# Patient Record
Sex: FEMALE | Race: WHITE | NOT HISPANIC OR LATINO | Employment: OTHER | URBAN - METROPOLITAN AREA
[De-identification: names, ages, dates, MRNs, and addresses within clinical notes are randomized per-mention and may not be internally consistent; named-entity substitution may affect disease eponyms.]

---

## 2017-03-22 ENCOUNTER — OFFICE VISIT (OUTPATIENT)
Dept: OPHTHALMOLOGY | Facility: CLINIC | Age: 62
End: 2017-03-22
Payer: COMMERCIAL

## 2017-03-22 DIAGNOSIS — Z96.1 PSEUDOPHAKIA OF BOTH EYES: ICD-10-CM

## 2017-03-22 DIAGNOSIS — Z98.890 HISTORY OF REFRACTIVE SURGERY: ICD-10-CM

## 2017-03-22 DIAGNOSIS — H43.813 PVD (POSTERIOR VITREOUS DETACHMENT), BILATERAL: Primary | ICD-10-CM

## 2017-03-22 DIAGNOSIS — Z83.511 FAMILY HISTORY OF GLAUCOMA: ICD-10-CM

## 2017-03-22 PROCEDURE — 92014 COMPRE OPH EXAM EST PT 1/>: CPT | Mod: S$GLB,,, | Performed by: OPHTHALMOLOGY

## 2017-03-22 PROCEDURE — 99999 PR PBB SHADOW E&M-EST. PATIENT-LVL II: CPT | Mod: PBBFAC,,, | Performed by: OPHTHALMOLOGY

## 2017-03-22 NOTE — MR AVS SNAPSHOT
[facility] - Ophthalmology  [address] [facility] [place] [address]  Phone: [phone]  Fax: [phone]                  Tammy Mackenzie   3/22/2017 2:30 PM   Office Visit    Description:  Female : 1955   Provider:  Javy Jones MD   Department:  [facility] - Ophthalmology           Reason for Visit     Blurred Vision     Spots and/or Floaters           Diagnoses this Visit        Comments    PVD (posterior vitreous detachment), bilateral    -  Primary     Pseudophakia of both eyes         History of refractive surgery         Family history of glaucoma                To Do List           Goals (5 Years of Data)     None      Follow-Up and Disposition     Return in about 1 year (around 3/22/2018).      [facility]     [facility] On Call Nurse [facility] -  Assistance  Registered nurses in the [facility] On Call Center provide clinical advisement, health education, appointment booking, and other advisory services.  Call for this free service at [phone].             Medications           Message regarding Medications     Verify the changes and/or additions to your medication regime listed below are the same as discussed with your clinician today.  If any of these changes or additions are incorrect, please notify your healthcare provider.             Verify that the below list of medications is an accurate representation of the medications you are currently taking.  If none reported, the list may be blank. If incorrect, please contact your healthcare provider. Carry this list with you in case of emergency.           Current Medications     METFORMIN HCL (METFORMIN ORAL) Take by mouth.    UNKNOWN TO PATIENT Thyroid medication    UNKNOWN TO PATIENT occasionally takes a blood pressure medication    erythromycin (ROMYCIN) ophthalmic ointment Place into both eyes every 6 (six) hours.    NAPHAZOLINE HCL (CLEAR EYES OPHT) Apply to eye.           Clinical Reference Information           Allergies as of  3/22/2017     Codeine      Immunizations Administered on Date of Encounter - 3/22/2017     None      [facility] Sign-Up     Activating your [facility] account is as easy as 1-2-3!     1) Visit [email or web address], select Sign Up Now, enter this activation code and your date of birth, then select Next.  [email or web address]  Expires: 5/6/2017  3:45 PM      2) Create a username and password to use when you visit [facility] in the future and select a security question in case you lose your password and select Next.    3) Enter your e-mail address and click Sign Up!    Additional Information  If you have questions, please e-mail [email or web address] or call [phone] to talk to our [facility] staff. Remember, [facility] is NOT to be used for urgent needs. For medical emergencies, dial 911.         Language Assistance Services     ATTENTION: Language assistance services are available, free of charge. Please call [phone].      ATENCIÓN: Si habla avis, tiene a munoz disposición servicios gratuitos de asistencia lingüística. Llame al [phone].     BETTE Ý: N?u b?n nói Ti?ng Vi?t, có các d?ch v? h? tr? ngôn ng? mi?n phí dành cho b?n. G?i s? [phone].         [facility] - [facility] complies with applicable Federal civil rights laws and does not discriminate on the basis of race, color, national origin, age, disability, or sex.

## 2017-03-22 NOTE — PROGRESS NOTES
SUBJECTIVE:   Tammy Mackenzie is a 61 y.o. female   Corrected distance visual acuity was 20/40 in the right eye and 20/80 in the left eye.   Chief Complaint   Patient presents with    Blurred Vision     pt states va has been very blurred distance and near with corrections    Spots and/or Floaters     pt stated a week ago there was a flash of light OS now seeing black floaters        HPI:  HPI     Blurred Vision    Additional comments: pt states va has been very blurred distance and near   with corrections           Spots and/or Floaters    Additional comments: pt stated a week ago there was a flash of light OS   now seeing black floaters       Last edited by Fatmata Knapp MA on 3/22/2017  2:54 PM. (History)        Assessment /Plan :  1. PVD (posterior vitreous detachment), bilateral Patient seen and evaluated for PVD OU. No tears or breaks were seen after careful retinal evaluation. Discussed retinal detachment signs and symptoms including flashes of lights, floaters, perceived curtains or veils. Advised to patient to monitor visual status including increase in flashes and floaters, or the development of visual field changes including curtain and /or veils. Advised patient to RTC urgently if these symptoms occur. Explained the need for follow up exams to the patient even if there are no changes in the symptoms.   2. Pseudophakia of both eyes doing well   3. History of refractive surgery refer to OD for contact lens fit   4.      Family history of glaucoma no disease now      RTC in 1 year or prn any changes

## 2017-03-28 ENCOUNTER — OFFICE VISIT (OUTPATIENT)
Dept: OPHTHALMOLOGY | Facility: CLINIC | Age: 62
End: 2017-03-28
Payer: COMMERCIAL

## 2017-03-28 DIAGNOSIS — Z96.1 PSEUDOPHAKIA OF BOTH EYES: ICD-10-CM

## 2017-03-28 DIAGNOSIS — H17.9 BILATERAL CORNEAL SCARS: ICD-10-CM

## 2017-03-28 DIAGNOSIS — H52.213 IRREGULAR ASTIGMATISM, BILATERAL: Primary | ICD-10-CM

## 2017-03-28 PROCEDURE — 99499 UNLISTED E&M SERVICE: CPT | Mod: S$GLB,,, | Performed by: OPTOMETRIST

## 2017-03-28 PROCEDURE — 99999 PR PBB SHADOW E&M-EST. PATIENT-LVL I: CPT | Mod: PBBFAC,,, | Performed by: OPTOMETRIST

## 2017-03-28 PROCEDURE — 92310 CONTACT LENS FITTING OU: CPT | Mod: ,,, | Performed by: OPTOMETRIST

## 2017-03-28 NOTE — LETTER
March 29, 2017      Javy Jones MD  [address] [address] [place] [address]           [facility] - Ophthalmology  [address] [place] [address]  Phone: [phone]  Fax: [phone]          Patient: Tammy Mackenzie   MR Number: [id number]   YOB: 1955   Date of Visit: 3/28/2017       Dear Dr. aJvy Jones:    Thank you for referring Tammy Mackenzie to me for evaluation. Attached you will find relevant portions of my assessment and plan of care.    If you have questions, please do not hesitate to call me. I look forward to following Tammy Mackenzie along with you.    Sincerely,    HEMANT Marte, OD    Enclosure  CC:  No Recipients    If you would like to receive this communication electronically, please contact [email or web address] or [phone] to request more information on [employer] Link access.    For providers and/or their staff who would like to refer a patient to [facility], please contact us through our one-stop-shop provider referral line, [facility] , at [phone].    If you feel you have received this communication in error or would no longer like to receive these types of communications, please e-mail [email or web address]

## 2017-03-28 NOTE — MR AVS SNAPSHOT
[facility] - Ophthalmology  [address] [facility] [address] [place] [address]  Phone: [phone]  Fax: [phone]                  Tammy Mackenzie   3/28/2017 2:45 PM   Office Visit    Description:  Female : 1955   Provider:  HEMANT Marte OD   Department:  [facility] - Ophthalmology           Reason for Visit     Contact Lens Fit           Diagnoses this Visit        Comments    Irregular astigmatism, bilateral    -  Primary     Bilateral corneal scars         Pseudophakia of both eyes                To Do List           Goals (5 Years of Data)     None      [facility]     [facility] Nurse Care [facility] -  Assistance  Registered nurses in the [facility] On Call Center provide clinical advisement, health education, appointment booking, and other advisory services.  Call for this free service at [phone].             Medications           Message regarding Medications     Verify the changes and/or additions to your medication regime listed below are the same as discussed with your clinician today.  If any of these changes or additions are incorrect, please notify your healthcare provider.             Verify that the below list of medications is an accurate representation of the medications you are currently taking.  If none reported, the list may be blank. If incorrect, please contact your healthcare provider. Carry this list with you in case of emergency.           Current Medications     erythromycin (ROMYCIN) ophthalmic ointment Place into both eyes every 6 (six) hours.    METFORMIN HCL (METFORMIN ORAL) Take by mouth.    NAPHAZOLINE HCL (CLEAR EYES OPHT) Apply to eye.    UNKNOWN TO PATIENT Thyroid medication    UNKNOWN TO PATIENT occasionally takes a blood pressure medication           Clinical Reference Information           Allergies as of 3/28/2017     Codeine      Immunizations Administered on Date of Encounter - 3/28/2017     None      [facility] Sign-Up     Activating your [facility] account is as  easy as 1-2-3!     1) Visit [email or web address], select Sign Up Now, enter this activation code and your date of birth, then select Next.  [email or web address]  Expires: 5/6/2017  3:45 PM      2) Create a username and password to use when you visit [facility] in the future and select a security question in case you lose your password and select Next.    3) Enter your e-mail address and click Sign Up!    Additional Information  If you have questions, please e-mail [email or web address] or call [phone] to talk to our [facility] staff. Remember, [facility] is NOT to be used for urgent needs. For medical emergencies, dial 911.         Language Assistance Services     ATTENTION: Language assistance services are available, free of charge. Please call [phone].      ATENCIÓN: Si habla avis, tiene a munoz disposición servicios gratuitos de asistencia lingüística. Llame al [phone].     [facility] Ý: N?u b?n nói Ti?ng Vi?t, có các d?ch v? h? tr? ngôn ng? mi?n phí dành cho b?n. G?i s? [phone].         [facility] complies with applicable Federal civil rights laws and does not discriminate on the basis of race, color, national origin, age, disability, or sex.

## 2017-03-29 NOTE — PROGRESS NOTES
HPI     Last CPG exam 03/22/2017  Diabetic  Pseudophakia, OU  PVD, bilateral  RK, OU  RE    MLC:  Severe irregular astigmatism OU secondary to unsuccessful RK/AK   surgeries in the past.  Referred to me by Robert to see if rigid CL are   an option to improve vision.  Coincidentally, the reps from the company   [employer] are here today.  They specialize in fitting hybrid CL on   irregular corneas.  They will assist and teach me today.       Last edited by HEMANT Marte, OD on 3/29/2017  3:28 PM.         Assessment /Plan     For exam results, see Encounter Report.    Irregular astigmatism, bilateral    Bilateral corneal scars    Pseudophakia of both eyes      Contact lens fit done today for irregular astigmatism.  [employer] reps helped me with the fit.  The corneal topography showed gross changes from the upper thirties to low fifties.  Multiple lenses from the reps fitting set were tried.  We ultimately settled on a fit which gives the patient 20/25 OD and 20/40 OS!!!  The patient is very happy.  We will order the lenses and dispense these when they arrive.  I told the patient that the total charges would be around $650.00 fit and CL included and she agreed.      Final lenses ordered were: [employer] FC      OD:  Vault = 455  Skirt = steep  Power = -7.25                                                  [employer] FC      OS:  Vault = 355  Skirt = steep  Power = -4.00      SEE AT DISPENSE.    CLEAR CARE OR BIOTRUE.

## 2017-04-12 ENCOUNTER — TELEPHONE (OUTPATIENT)
Dept: OPHTHALMOLOGY | Facility: CLINIC | Age: 62
End: 2017-04-12

## 2017-04-12 NOTE — TELEPHONE ENCOUNTER
Spoke to patient  And scheduled appointment for tomorrow for fiting. Alley states Dr. Marte has CL in his office

## 2017-04-12 NOTE — TELEPHONE ENCOUNTER
----- Message from Maribel James sent at 4/12/2017  3:49 PM CDT -----  Contact: Pt   Pt called and stated she needed to speak to the nurse. She stated she called three (3) times before this time requesting her contact lenses from Dr. Marte. She stated she will going out of town ans will need them to drive. She can be reached at [phone].    Thanks,  TF

## 2017-04-13 ENCOUNTER — OFFICE VISIT (OUTPATIENT)
Dept: OPHTHALMOLOGY | Facility: CLINIC | Age: 62
End: 2017-04-13
Payer: COMMERCIAL

## 2017-04-13 DIAGNOSIS — Z46.0 CONTACT LENS/GLASSES FITTING: Primary | ICD-10-CM

## 2017-04-13 PROCEDURE — 99499 UNLISTED E&M SERVICE: CPT | Mod: S$GLB,,, | Performed by: OPTOMETRIST

## 2017-04-13 PROCEDURE — 99999 PR PBB SHADOW E&M-EST. PATIENT-LVL I: CPT | Mod: PBBFAC,,, | Performed by: OPTOMETRIST

## 2017-04-13 NOTE — MR AVS SNAPSHOT
[facility] - Ophthalmology  [address] [place] [address]  Phone: [phone]  Fax: [phone]                  Tammy Mackenzie   2017 3:30 PM   Office Visit    Description:  Female : 1955   Provider:  HEMANT Marte, OD   Department:  [facility] - Ophthalmology           Reason for Visit     Contact Lens Follow Up           Diagnoses this Visit        Comments    Contact lens/glasses fitting    -  Primary            To Do List           Future Appointments        Provider Department Dept Phone    2017 3:15 PM HEMANT Marte, OD [facility] Ophthalmology [phone]      Goals (5 Years of Data)     None      Follow-Up and Disposition     Return if symptoms worsen or fail to improve.      [facility] On Call     [facility] On Call Nurse Care Line - 24/ Assistance  Unless otherwise directed by your provider, please contact [facility], our nurse care line that is available for /7 assistance.     Registered nurses in the [facility] On Call Center provide: appointment scheduling, clinical advisement, health education, and other advisory services.  Call: [phone] (toll free)               Medications           Message regarding Medications     Verify the changes and/or additions to your medication regime listed below are the same as discussed with your clinician today.  If any of these changes or additions are incorrect, please notify your healthcare provider.             Verify that the below list of medications is an accurate representation of the medications you are currently taking.  If none reported, the list may be blank. If incorrect, please contact your healthcare provider. Carry this list with you in case of emergency.           Current Medications     erythromycin (ROMYCIN) ophthalmic ointment Place into both eyes every 6 (six) hours.    METFORMIN HCL (METFORMIN ORAL) Take by mouth.    NAPHAZOLINE HCL (CLEAR EYES OPHT) Apply to eye.    UNKNOWN TO PATIENT Thyroid medication    UNKNOWN TO  PATIENT occasionally takes a blood pressure medication           Clinical Reference Information           Allergies as of 4/13/2017     Codeine      Immunizations Administered on Date of Encounter - 4/13/2017     None      [facility] Sign-Up     Activating your [facility] account is as easy as 1-2-3!     1) Visit [email or web address], select Sign Up Now, enter this activation code and your date of birth, then select Next.  [email or web address]  Expires: 5/6/2017  3:45 PM      2) Create a username and password to use when you visit [facility] in the future and select a security question in case you lose your password and select Next.    3) Enter your e-mail address and click Sign Up!    Additional Information  If you have questions, please e-mail [email or web address] or call [phone] to talk to our [facility] staff. Remember, [facility] is NOT to be used for urgent needs. For medical emergencies, dial 911.         Language Assistance Services     ATTENTION: Language assistance services are available, free of charge. Please call [phone].      ATENCIÓN: Si habla español, tiene a munoz disposición servicios gratuitos de asistencia lingüística. Llame al [phone].     CHÚ Ý: N?u b?n nói Ti?ng Vi?t, có các d?ch v? h? tr? ngôn ng? mi?n phí dành cho b?n. G?i s? [phone].         [facility] - Ophthalmology complies with applicable Federal civil rights laws and does not discriminate on the basis of race, color, national origin, age, disability, or sex.

## 2017-04-18 NOTE — PROGRESS NOTES
HPI     Last MLC exam 03/28/2017  CL follow up  Vision is not clear   Patient states if she pushes up on the lenses images come in clear for a   split second  Lenses seem to have a film over them      MLC:  Dispensing of [employer] hybrid lenses.       Last edited by HEMANT Marte, OD on 4/18/2017  4:25 PM.         Assessment /Plan     For exam results, see Encounter Report.    Contact lens/glasses fitting      We inserted lenses and could not get the bubble out from below either lens.  The right lens was worse.  I think that this is due to our lack of expertise in inserting the lenses due to their special design.  I will consult with the company for advise.  I kept the lenses in solution (Clear Care).  I will call the patient back for a retry once I consult the company.  She was booked for next week.  I talked to the consultant.

## 2017-04-26 ENCOUNTER — OFFICE VISIT (OUTPATIENT)
Dept: OPHTHALMOLOGY | Facility: CLINIC | Age: 62
End: 2017-04-26
Payer: COMMERCIAL

## 2017-04-26 DIAGNOSIS — Z46.0 CONTACT LENS/GLASSES FITTING: ICD-10-CM

## 2017-04-26 DIAGNOSIS — H52.213 IRREGULAR ASTIGMATISM OF BOTH EYES: Primary | ICD-10-CM

## 2017-04-26 PROCEDURE — 99499 UNLISTED E&M SERVICE: CPT | Mod: S$GLB,,, | Performed by: OPTOMETRIST

## 2017-04-26 NOTE — MR AVS SNAPSHOT
[facility] - Ophthalmology  [address] [facility] [address] [place] [address]  Phone: [phone]  Fax: [phone]                  Tammy Mackenzie   2017 3:15 PM   Office Visit    Description:  Female : 1955   Provider:  HEMANT Marte OD   Department:  [facility] - Ophthalmology           Reason for Visit     Contact Lens Follow Up           Diagnoses this Visit        Comments    Irregular astigmatism of both eyes    -  Primary     Contact lens/glasses fitting                To Do List           Future Appointments        Provider Department Dept Phone    5/3/2017 10:30 AM HEMANT Marte OD [facility] Ophthalmology [phone]      Goals (5 Years of Data)     None      Follow-Up and Disposition     Return in about 1 week (around 5/3/2017).      [facility] On Call     [facility] On Call Nurse Care Line -  Assistance  Unless otherwise directed by your provider, please contact [facility], our nurse care line that is available for  assistance.     Registered nurses in the [facility] On Call Center provide: appointment scheduling, clinical advisement, health education, and other advisory services.  Call: [phone] (toll free)               Medications           Message regarding Medications     Verify the changes and/or additions to your medication regime listed below are the same as discussed with your clinician today.  If any of these changes or additions are incorrect, please notify your healthcare provider.             Verify that the below list of medications is an accurate representation of the medications you are currently taking.  If none reported, the list may be blank. If incorrect, please contact your healthcare provider. Carry this list with you in case of emergency.           Current Medications     erythromycin (ROMYCIN) ophthalmic ointment Place into both eyes every 6 (six) hours.    METFORMIN HCL (METFORMIN ORAL) Take by mouth.    NAPHAZOLINE HCL (CLEAR EYES OPHT) Apply to eye.    UNKNOWN TO  PATIENT Thyroid medication    UNKNOWN TO PATIENT occasionally takes a blood pressure medication           Clinical Reference Information           Allergies as of 4/26/2017     Codeine      Immunizations Administered on Date of Encounter - 4/26/2017     None      [facility] Sign-Up     Activating your [facility] account is as easy as 1-2-3!     1) Visit [email or web address], select Sign Up Now, enter this activation code and your date of birth, then select Next.  [email or web address]  Expires: 5/6/2017  3:45 PM      2) Create a username and password to use when you visit [facility] in the future and select a security question in case you lose your password and select Next.    3) Enter your e-mail address and click Sign Up!    Additional Information  If you have questions, please e-mail [email or web address] or call [phone] to talk to our [facility] staff. Remember, [facility] is NOT to be used for urgent needs. For medical emergencies, dial 911.         Language Assistance Services     ATTENTION: Language assistance services are available, free of charge. Please call [phone].      ATENCIÓN: Si habla español, tiene a munoz disposición servicios gratuitos de asistencia lingüística. Llame al [phone].     CHÚ Ý: N?u b?n nói Ti?ng Vi?t, có các d?ch v? h? tr? ngôn ng? mi?n phí dành cho b?n. G?i s? [phone].         [facility] - Ophthalmology complies with applicable Federal civil rights laws and does not discriminate on the basis of race, color, national origin, age, disability, or sex.

## 2017-05-01 NOTE — PROGRESS NOTES
HPI     Last MLC visit 04/13/2017  CL follow up  Picking up new  [employer]ge[employer] lenses for her irregular astigmatism.       Last edited by HEMANT Marte, OD on 5/1/2017  3:48 PM.         Assessment /Plan     For exam results, see Encounter Report.    Irregular astigmatism of both eyes    Contact lens/glasses fitting      Excellent fit and vision.  I will need to change the power in the OS per the OR but want her to wear these lenses for one week and due a CLFU before any reorder as I might have to change the fit a bit, as well depending on her comfort and vision reports at the RTC.

## 2017-05-03 ENCOUNTER — OFFICE VISIT (OUTPATIENT)
Dept: OPHTHALMOLOGY | Facility: CLINIC | Age: 62
End: 2017-05-03
Payer: COMMERCIAL

## 2017-05-03 DIAGNOSIS — Z46.0 CONTACT LENS/GLASSES FITTING: Primary | ICD-10-CM

## 2017-05-03 PROCEDURE — 99499 UNLISTED E&M SERVICE: CPT | Mod: S$GLB,,, | Performed by: OPTOMETRIST

## 2017-05-03 NOTE — MR AVS SNAPSHOT
[facility] Ophthalmology  [address] [place] [address]  Phone: [phone]  Fax: [phone]                  Tammy Mackenzie   5/3/2017 10:30 AM   Office Visit    Description:  Female : 1955   Provider:  HEMANT Marte OD   Department:  [facility]           Reason for Visit     Contact Lens Follow Up           Diagnoses this Visit        Comments    Contact lens/glasses fitting    -  Primary            To Do List           Goals (5 Years of Data)     None      Follow-Up and Disposition     Return if symptoms worsen or fail to improve.      [facility]     [facility] Nurse Care Line -  Assistance  Unless otherwise directed by your provider, please contact [facility], our nurse care line that is available for  assistance.     Registered nurses in the [facility] provide: appointment scheduling, clinical advisement, health education, and other advisory services.  Call: [phone] (toll free)               Medications           Message regarding Medications     Verify the changes and/or additions to your medication regime listed below are the same as discussed with your clinician today.  If any of these changes or additions are incorrect, please notify your healthcare provider.             Verify that the below list of medications is an accurate representation of the medications you are currently taking.  If none reported, the list may be blank. If incorrect, please contact your healthcare provider. Carry this list with you in case of emergency.           Current Medications     erythromycin (ROMYCIN) ophthalmic ointment Place into both eyes every 6 (six) hours.    METFORMIN HCL (METFORMIN ORAL) Take by mouth.    NAPHAZOLINE HCL (CLEAR EYES OPHT) Apply to eye.    UNKNOWN TO PATIENT Thyroid medication    UNKNOWN TO PATIENT occasionally takes a blood pressure medication           Clinical Reference Information           Allergies as of 5/3/2017      Codeine      Immunizations Administered on Date of Encounter - 5/3/2017     None      [facility] Sign-Up     Activating your [facility] account is as easy as 1-2-3!     1) Visit [email or web address], select Sign Up Now, enter this activation code and your date of birth, then select Next.  [email or web address]  Expires: 6/23/2017  4:38 PM      2) Create a username and password to use when you visit [facility] in the future and select a security question in case you lose your password and select Next.    3) Enter your e-mail address and click Sign Up!    Additional Information  If you have questions, please e-mail [email or web address] or call [phone] to talk to our [facility] staff. Remember, [facility] is NOT to be used for urgent needs. For medical emergencies, dial 911.         Language Assistance Services     ATTENTION: Language assistance services are available, free of charge. Please call [phone].      ATENCIÓN: Si habla español, tiene a munoz disposición servicios gratuitos de asistencia lingüística. Llame al [phone].     CHÚ Ý: N?u b?n nói Ti?ng Vi?t, có các d?ch v? h? tr? ngôn ng? mi?n phí dành cho b?n. G?i s? [phone].         [facility] complies with applicable Federal civil rights laws and does not discriminate on the basis of race, color, national origin, age, disability, or sex.

## 2017-05-09 NOTE — PROGRESS NOTES
HPI     Last [facility] visit 04/26/2017  Contact lens follow up  Patient states that vision is better  Patient states that by the end of the day lenses feel tight and difficult   to get off of the eye    MLC:  She says the lenses are almost impossible to remove from either eye   and this scares her.  She absolutely loves the vision she gets from these   lenses (20/25 versus 20/100 with glasses).       Last edited by HEMANT Marte, OD on 5/9/2017  4:36 PM.         Assessment /Plan     For exam results, see Encounter Report.    Contact lens/glasses fitting     This is a soft/RGP hybrid lens.  I had trouble removing both lenses from her eyes today.  I think either the skirt or base curve is too tight OU (or both).  I called Adriana, the consultant with [employer], and after consultation we decided to loosen the RGP base curves and leave the skirt alone. Order# [id number].  See at dispense.  At dispense I must make sure she is not forcing the lens onto her eyes.  Vision is great but the lenses are so hard to remove that they are not wearable as is.      (Adriana, the [employer] consultant, told me just to throw away the old lenses once we determine the new order is adequate.)

## 2017-05-19 ENCOUNTER — TELEPHONE (OUTPATIENT)
Dept: OPHTHALMOLOGY | Facility: CLINIC | Age: 62
End: 2017-05-19

## 2017-05-19 NOTE — TELEPHONE ENCOUNTER
Called to inform patient that we have not received new lenses. When we get them in I will give her a call so that we can make proper arrangements for her to come in and be fitted.

## 2017-05-19 NOTE — TELEPHONE ENCOUNTER
----- Message from Cinthya Ratliff sent at 5/19/2017 12:06 PM CDT -----  Contact: pt  Pt is calling nurse staff regarding a patient contact lenses. Pt call back to be advised [phone] thanks

## 2017-06-01 ENCOUNTER — TELEPHONE (OUTPATIENT)
Dept: OPHTHALMOLOGY | Facility: CLINIC | Age: 62
End: 2017-06-01

## 2017-06-01 NOTE — TELEPHONE ENCOUNTER
----- Message from Cinthya Ratliff sent at 6/1/2017  3:02 PM CDT -----  Contact: pt  Pt is calling Nurse staff regarding the RX for contacts that was recorded. Pt  stated that its been a month  Already. Pt is having trouble seeing without them.   Pt call back [phone]. thanks

## 2017-06-07 ENCOUNTER — OFFICE VISIT (OUTPATIENT)
Dept: OPHTHALMOLOGY | Facility: CLINIC | Age: 62
End: 2017-06-07
Payer: COMMERCIAL

## 2017-06-07 DIAGNOSIS — Z46.0 CONTACT LENS/GLASSES FITTING: Primary | ICD-10-CM

## 2017-06-07 PROCEDURE — 99499 UNLISTED E&M SERVICE: CPT | Mod: S$GLB,,, | Performed by: OPTOMETRIST

## 2017-06-07 NOTE — PROGRESS NOTES
HPI     Last [facility] visit 05/03/2017  CL follow up  Patient is picking up new lenses  [employer] FC Vault = 400, SC = Steep (7.9) Rx = -6.25 OD  [employer] FC Vault = 300, SC = Steep (7.9) Rx = -2.00 OS      Last edited by Fritz Walker MA on 6/7/2017  4:03 PM. (History)            Assessment /Plan     For exam results, see Encounter Report.    Contact lens/glasses fitting      New lenses center and move well.  Both lenses removed well by patient.  These lenses were ordered because she could not remove the initial pair as they were too tight.  We adjusted the BC and today all seems well in vision, comfort, movement, and removal.  I will follow up one more time in 7-14 days.

## 2017-06-15 ENCOUNTER — TELEPHONE (OUTPATIENT)
Dept: OPHTHALMOLOGY | Facility: CLINIC | Age: 62
End: 2017-06-15

## 2017-06-15 NOTE — TELEPHONE ENCOUNTER
----- Message from Pam Maret OD sent at 6/15/2017  1:13 PM CDT -----  Contact: [phone]      ----- Message -----  From: Latonia Pearson  Sent: 6/15/2017  11:34 AM  To: Rosanna Orozco Staff    Pt would like nurse to consult about contact lens. She says lens are painful/uncomfortable. Please call back at [phone]. x. LJ

## 2017-06-19 ENCOUNTER — OFFICE VISIT (OUTPATIENT)
Dept: OPHTHALMOLOGY | Facility: CLINIC | Age: 62
End: 2017-06-19
Payer: COMMERCIAL

## 2017-06-19 DIAGNOSIS — Z46.0 CONTACT LENS/GLASSES FITTING: Primary | ICD-10-CM

## 2017-06-19 PROCEDURE — 99499 UNLISTED E&M SERVICE: CPT | Mod: S$GLB,,, | Performed by: OPTOMETRIST

## 2017-06-20 NOTE — PROGRESS NOTES
"HPI     Last [facility] visit 06/07/2017  CL follow up  [employer] FC Vault = 405, SC = Steep (7.9) Rx = -6.25 OD  [employer] FC Vault = 305, SC = Steep (7.9) Rx = -2.00 OD    Patient states that vision is great in both lenses  Right lens starts feels uncomfortable upon placing lens in the eye and   becomes more uncomfortable as time goes on  Right eye starts to water and vision becomes blurred  Left lens feels comfortable upon placing into the eye.  Patient states right eye feels sore and she has not worn for two days.  She is having no problems with vision or comfort with the left lens, only   the comfort (not vision) in the right lens.  She is here today to   troubleshoot the problems with the new OD.       Last edited by HEMANT Marte, OD on 6/20/2017  4:16 PM. (History)            Assessment /Plan     For exam results, see Encounter Report.    Contact lens/glasses fitting      Left lens is comfortable with good vision with above parameters.  No changes needed.  Right lens is too tight.  I will adjust the parameters in the right eye = flatten the BC and change power accordingly for the BC change.  No change in skirt. (order # [id number])  New BC is 305.  New power is -5.25.  Dispense and see at dispense.    SUMMARY (As an aside, the beginning lenses were so tight she could not remove them.  I reordered with flatter BC with no change in skirt:    Original lenses were:  OD     [employer] FC   Vault: 450  Skirt: steep @ 7.9   Power=-7.25   CONSULTANT = LORRAINE NUNEZ            "                      "       350     "          "                 Power=-4.00    SECOND ORDER:        OD            "                      "       405     "          "         "       Power=-6.25  (TOO TIGHT WITH DISCOMFORT BUT VISION ok)  CONSULTANT: KYLIE                                          OS            "                      "       305     "          "         "       Power=-2.00  (GREAT FIT AND " "VISION = NO PROBLEM)    TODAY(change OD only)              "                      "       355    "           "         "       Power=-5.25  CONSULTANT: ALEXEI                                                    NO CHANGE IN OS = GOOD FIT AND VISION.          SEE AT DISPENSE.  I DID THE ORDER.  PATIENT STILL NEEDS TO BE CHARGED.                   "

## 2017-06-27 ENCOUNTER — TELEPHONE (OUTPATIENT)
Dept: OPHTHALMOLOGY | Facility: CLINIC | Age: 62
End: 2017-06-27

## 2017-06-27 NOTE — TELEPHONE ENCOUNTER
----- Message from Cinthya Ratliff sent at 6/27/2017  3:40 PM CDT -----  Contact: pt  Pt is calling nurse staff regarding pt is having trouble with the contacts. The contacts are causing a lot a pain, eye were swollen and thorbing. Pt call back to be advised asap [phone] thanks

## 2020-08-11 ENCOUNTER — OFFICE VISIT (OUTPATIENT)
Dept: OPHTHALMOLOGY | Facility: CLINIC | Age: 65
End: 2020-08-11
Payer: MEDICARE

## 2020-08-11 DIAGNOSIS — Z83.511 FAMILY HISTORY OF GLAUCOMA: ICD-10-CM

## 2020-08-11 DIAGNOSIS — H04.129 DRY EYE: ICD-10-CM

## 2020-08-11 DIAGNOSIS — H52.213 IRREGULAR ASTIGMATISM OF BOTH EYES: ICD-10-CM

## 2020-08-11 DIAGNOSIS — Z96.1 PSEUDOPHAKIA: Primary | ICD-10-CM

## 2020-08-11 DIAGNOSIS — Z98.890 HISTORY OF REFRACTIVE SURGERY: ICD-10-CM

## 2020-08-11 DIAGNOSIS — H40.013 OPEN ANGLE WITH BORDERLINE FINDINGS, LOW RISK, BILATERAL: ICD-10-CM

## 2020-08-11 PROCEDURE — 92004 PR EYE EXAM, NEW PATIENT,COMPREHESV: ICD-10-PCS | Mod: S$GLB,,, | Performed by: OPHTHALMOLOGY

## 2020-08-11 PROCEDURE — 92133 CPTRZD OPH DX IMG PST SGM ON: CPT | Mod: S$GLB,,, | Performed by: OPHTHALMOLOGY

## 2020-08-11 PROCEDURE — 92133 POSTERIOR SEGMENT OCT OPTIC NERVE(OCULAR COHERENCE TOMOGRAPHY) - OU - BOTH EYES: ICD-10-PCS | Mod: S$GLB,,, | Performed by: OPHTHALMOLOGY

## 2020-08-11 PROCEDURE — 99999 PR PBB SHADOW E&M-EST. PATIENT-LVL II: CPT | Mod: PBBFAC,,, | Performed by: OPHTHALMOLOGY

## 2020-08-11 PROCEDURE — 99999 PR PBB SHADOW E&M-EST. PATIENT-LVL II: ICD-10-PCS | Mod: PBBFAC,,, | Performed by: OPHTHALMOLOGY

## 2020-08-11 PROCEDURE — 92004 COMPRE OPH EXAM NEW PT 1/>: CPT | Mod: S$GLB,,, | Performed by: OPHTHALMOLOGY

## 2020-08-11 RX ORDER — LISINOPRIL AND HYDROCHLOROTHIAZIDE 10; 12.5 MG/1; MG/1
1 TABLET ORAL
COMMUNITY
Start: 2020-03-10

## 2020-08-11 RX ORDER — LEVOTHYROXINE SODIUM 200 UG/1
200 TABLET ORAL
COMMUNITY
Start: 2020-03-10

## 2020-08-11 RX ORDER — MELOXICAM 7.5 MG/1
7.5 TABLET ORAL
COMMUNITY
Start: 2020-03-10 | End: 2021-03-10

## 2020-08-11 RX ORDER — DEXTROSE 4 G
TABLET,CHEWABLE ORAL
COMMUNITY
Start: 2020-01-13

## 2020-08-11 NOTE — PROGRESS NOTES
SUBJECTIVE  Tammy Mackenzie is 64 y.o. female  Corrected distance visual acuity was 20/80 in the right eye and 20/70 in the left eye.   Chief Complaint   Patient presents with    Pain     pain through OS          HPI     Pain      Additional comments: pain through OS              Comments     PT C/O: Having a sharp shooting pain throughout OS lasting a few minutes   at a time several times a day over the last several months, also VA is   always cloudy and blurry and patient has limited her driving because of   the decrease in VA, also at times OU appear red and OS has a constant FB   sensation.        Daughter of Rosalind Brady (Former patient)    1.PCIOL OU ([facility])  2.YAG OU ([facility]es)  3.8 cut RK Sx w/ multiple AK Sutures OU ([facility])  4.Irregular Astigmatism  5. Blepharoplasty 9/15  6. FHx COAG, mother      Systane PRN OU          Last edited by Zarina Thomas, Patient Care Assistant on 8/11/2020  4:10   PM. (History)         Assessment /Plan :  1. Pseudophakia stable   2. Dry eye Findings and symptoms consistent with moderate dry eyes. Dry eye instruction sheet reviewed with patient recommending:AT's qid/titrate prn, Lubricating Oint qhs and prn and [place] 3 Fish Oils 8756-3293 mg po bid     3. History of refractive surgery    4. Irregular astigmatism of both eyes from post RK with raised dry area OS  Use lube qhs and new Rx given to pt. Also rec consult with Dr Belle as vision is debilitated and unsuccessful CL tolerance in past.   5. Family history of glaucoma  Some cup asymmetry OS and bord IOPs but GOCT normal  RTC in 1 year or prn any changes  With GOCT

## 2020-08-11 NOTE — LETTER
[facility]  [address]  [place] [address]  Phone: [phone]  Fax: [phone]   August 11, 2020    Florin Belle MD  [address] [place] [address]    Patient: Tammy Mackenzie   MR Number: [id number]   YOB: 1955   Date of Visit: 8/11/2020       Dear Dr. Belle:    I am referring Tammy Mackenzie for evaluation. Here is my assessment and plan of care:    Assessment  /Plan :  1. Pseudophakia stable   2. Dry eye Findings and symptoms consistent with moderate dry eyes. Dry eye instruction sheet reviewed with patient recommending:AT's qid/titrate prn, Lubricating Oint qhs and prn and [place] 3 Fish Oils 1642-6157 mg po bid     3. History of refractive surgery    4. Irregular astigmatism of both eyes from post RK with raised dry area OS  Use lube qhs and new Rx given to pt. Also rec consult with Dr Belle as vision is debilitated and unsuccessful CL tolerance in past.   5. Family history of glaucoma            Below you will find my full exam findings. If you have questions, please do not hesitate to call me. I look forward to following Ms. Tammy Mackenzie along with you.    Sincerely,        Javy Jones MD       CC  No Recipients             Base Eye Exam     Visual Acuity (Snellen - Linear)       Right Left    Dist cc 20/80 20/70    Correction: Glasses          Tonometry (Applanation, 4:03 PM)       Right Left    Pressure 20 20          Pupils       Pupils    Right PERRL    Left PERRL          Visual Fields (Counting fingers)       Right Left     Full Full          Extraocular Movement       Right Left     Full Full          Neuro/Psych     Oriented x3: Yes    Mood/Affect: Normal          Dilation     Both eyes: 1% Mydriacyl, 2.5% Phenylephrine @ 4:05 PM            Slit Lamp and Fundus Exam     External Exam       Right Left    MRD1 2 mm 2 mm    Levator 16 mm 16 mm          Slit Lamp Exam       Right Left    Lids/Lashes Normal Normal    Conjunctiva/Sclera  White and quiet White and quiet    Cornea 8 cut RK with AK's x 4 8 cut RK with AK's x 2, raised with PEK above inferior AK incision    Anterior Chamber Deep and quiet Deep and quiet    Iris Round and reactive Round and reactive    Lens PC IOL, Toric IOL , Clear capsulotomy PC IOL, Toric IOL, Clear capsulotomy    Vitreous no cells no cells          Fundus Exam       Right Left    Disc Cup Sloping: Inferotemporal, Peripapillary atrophy Inferior-temporal Cup: Rim thinning, Peripapillary atrophy    C/D Ratio Vertical 0.45 0.5    Macula Normal Normal    Vessels Normal Normal    Periphery Normal Normal            Refraction     Wearing Rx       Sphere Cylinder Axis Add    Right -3.00 +5.00 140 +2.50    Left -2.00 +5.50 170 +2.50    Age: 1yr    Type: PAL          Manifest Refraction       Sphere Cylinder [place] Dist VA    Right -5.25 +5.00 140 20/50-2    Left -5.50 +4.75 160 20/70

## 2020-08-12 ENCOUNTER — TELEPHONE (OUTPATIENT)
Dept: OPHTHALMOLOGY | Facility: CLINIC | Age: 65
End: 2020-08-12

## 2020-08-12 NOTE — TELEPHONE ENCOUNTER
----- Message from Wojciech Oneill sent at 8/12/2020  9:40 AM CDT -----  Contact: Sosa  Would like to consult with nurse regarding patient.  Sosa/ states she does not have contact info to get in contact with patient.  Please contact Sosa @ [phone] ext [phone]. Thanks/As

## 2021-08-12 ENCOUNTER — OFFICE VISIT (OUTPATIENT)
Dept: OPHTHALMOLOGY | Facility: CLINIC | Age: 66
End: 2021-08-12
Payer: MEDICARE

## 2021-08-12 DIAGNOSIS — H52.213 IRREGULAR ASTIGMATISM OF BOTH EYES: ICD-10-CM

## 2021-08-12 DIAGNOSIS — Z83.511 FAMILY HISTORY OF GLAUCOMA: ICD-10-CM

## 2021-08-12 DIAGNOSIS — Z98.890 HISTORY OF REFRACTIVE SURGERY: ICD-10-CM

## 2021-08-12 DIAGNOSIS — H04.129 DRY EYE: ICD-10-CM

## 2021-08-12 DIAGNOSIS — H40.013 OPEN ANGLE WITH BORDERLINE FINDINGS AND LOW GLAUCOMA RISK IN BOTH EYES: Primary | ICD-10-CM

## 2021-08-12 DIAGNOSIS — Z96.1 PSEUDOPHAKIA: ICD-10-CM

## 2021-08-12 PROCEDURE — 92014 PR EYE EXAM, EST PATIENT,COMPREHESV: ICD-10-PCS | Mod: S$GLB,,, | Performed by: OPHTHALMOLOGY

## 2021-08-12 PROCEDURE — 99999 PR PBB SHADOW E&M-EST. PATIENT-LVL I: ICD-10-PCS | Mod: PBBFAC,,, | Performed by: OPHTHALMOLOGY

## 2021-08-12 PROCEDURE — 99999 PR PBB SHADOW E&M-EST. PATIENT-LVL I: CPT | Mod: PBBFAC,,, | Performed by: OPHTHALMOLOGY

## 2021-08-12 PROCEDURE — 1159F MED LIST DOCD IN RCRD: CPT | Mod: CPTII,S$GLB,, | Performed by: OPHTHALMOLOGY

## 2021-08-12 PROCEDURE — 92014 COMPRE OPH EXAM EST PT 1/>: CPT | Mod: S$GLB,,, | Performed by: OPHTHALMOLOGY

## 2021-08-12 PROCEDURE — 1160F RVW MEDS BY RX/DR IN RCRD: CPT | Mod: CPTII,S$GLB,, | Performed by: OPHTHALMOLOGY

## 2021-08-12 PROCEDURE — 1160F PR REVIEW ALL MEDS BY PRESCRIBER/CLIN PHARMACIST DOCUMENTED: ICD-10-PCS | Mod: CPTII,S$GLB,, | Performed by: OPHTHALMOLOGY

## 2021-08-12 PROCEDURE — 92133 CPTRZD OPH DX IMG PST SGM ON: CPT | Mod: S$GLB,,, | Performed by: OPHTHALMOLOGY

## 2021-08-12 PROCEDURE — 92133 POSTERIOR SEGMENT OCT OPTIC NERVE(OCULAR COHERENCE TOMOGRAPHY) - OU - BOTH EYES: ICD-10-PCS | Mod: S$GLB,,, | Performed by: OPHTHALMOLOGY

## 2021-08-12 PROCEDURE — 1159F PR MEDICATION LIST DOCUMENTED IN MEDICAL RECORD: ICD-10-PCS | Mod: CPTII,S$GLB,, | Performed by: OPHTHALMOLOGY

## 2022-04-04 ENCOUNTER — OFFICE VISIT (OUTPATIENT)
Dept: OPHTHALMOLOGY | Facility: CLINIC | Age: 67
End: 2022-04-04
Payer: MEDICARE

## 2022-04-04 DIAGNOSIS — H40.013 OPEN ANGLE WITH BORDERLINE FINDINGS AND LOW GLAUCOMA RISK IN BOTH EYES: Primary | ICD-10-CM

## 2022-04-04 DIAGNOSIS — Z98.890 HISTORY OF REFRACTIVE SURGERY: ICD-10-CM

## 2022-04-04 DIAGNOSIS — H04.129 DRY EYE: ICD-10-CM

## 2022-04-04 DIAGNOSIS — Z83.511 FAMILY HISTORY OF GLAUCOMA: ICD-10-CM

## 2022-04-04 DIAGNOSIS — Z96.1 PSEUDOPHAKIA: ICD-10-CM

## 2022-04-04 PROCEDURE — 99213 PR OFFICE/OUTPT VISIT, EST, LEVL III, 20-29 MIN: ICD-10-PCS | Mod: S$GLB,,, | Performed by: OPHTHALMOLOGY

## 2022-04-04 PROCEDURE — 1160F RVW MEDS BY RX/DR IN RCRD: CPT | Mod: CPTII,S$GLB,, | Performed by: OPHTHALMOLOGY

## 2022-04-04 PROCEDURE — 92083 HUMPHREY VISUAL FIELD - OU - BOTH EYES: ICD-10-PCS | Mod: S$GLB,,, | Performed by: OPHTHALMOLOGY

## 2022-04-04 PROCEDURE — 2023F PR DILATED RETINAL EXAM W/O EVID OF RETINOPATHY: ICD-10-PCS | Mod: CPTII,S$GLB,, | Performed by: OPHTHALMOLOGY

## 2022-04-04 PROCEDURE — 2023F DILAT RTA XM W/O RTNOPTHY: CPT | Mod: CPTII,S$GLB,, | Performed by: OPHTHALMOLOGY

## 2022-04-04 PROCEDURE — 92083 EXTENDED VISUAL FIELD XM: CPT | Mod: S$GLB,,, | Performed by: OPHTHALMOLOGY

## 2022-04-04 PROCEDURE — 99999 PR PBB SHADOW E&M-EST. PATIENT-LVL II: CPT | Mod: PBBFAC,,, | Performed by: OPHTHALMOLOGY

## 2022-04-04 PROCEDURE — 1159F PR MEDICATION LIST DOCUMENTED IN MEDICAL RECORD: ICD-10-PCS | Mod: CPTII,S$GLB,, | Performed by: OPHTHALMOLOGY

## 2022-04-04 PROCEDURE — 99213 OFFICE O/P EST LOW 20 MIN: CPT | Mod: S$GLB,,, | Performed by: OPHTHALMOLOGY

## 2022-04-04 PROCEDURE — 92133 CPTRZD OPH DX IMG PST SGM ON: CPT | Mod: S$GLB,,, | Performed by: OPHTHALMOLOGY

## 2022-04-04 PROCEDURE — 99999 PR PBB SHADOW E&M-EST. PATIENT-LVL II: ICD-10-PCS | Mod: PBBFAC,,, | Performed by: OPHTHALMOLOGY

## 2022-04-04 PROCEDURE — 1160F PR REVIEW ALL MEDS BY PRESCRIBER/CLIN PHARMACIST DOCUMENTED: ICD-10-PCS | Mod: CPTII,S$GLB,, | Performed by: OPHTHALMOLOGY

## 2022-04-04 PROCEDURE — 1159F MED LIST DOCD IN RCRD: CPT | Mod: CPTII,S$GLB,, | Performed by: OPHTHALMOLOGY

## 2022-04-04 PROCEDURE — 92133 POSTERIOR SEGMENT OCT OPTIC NERVE(OCULAR COHERENCE TOMOGRAPHY) - OU - BOTH EYES: ICD-10-PCS | Mod: S$GLB,,, | Performed by: OPHTHALMOLOGY

## 2022-04-04 NOTE — PROGRESS NOTES
SUBJECTIVE  Tammy Mackenzie is 66 y.o. female  Corrected distance visual acuity was CF at 3' in the right eye and 20/80 in the left eye.   Chief Complaint   Patient presents with    Glaucoma     Patient reports for 9 month IOP check. Using gtts as advised. Denies pain or irritation at this time. Reports vision has declined since last visit, had appt scheduled with DKT for cls fitting but never rescheduled. Reports of dry ness OU, uses rewetting drops for relief.          HPI     Glaucoma      Additional comments: Patient reports for 9 month IOP check. Using gtts   as advised. Denies pain or irritation at this time. Reports vision has   declined since last visit, had appt scheduled with DKT for cls fitting but   never rescheduled. Reports of dry ness OU, uses rewetting drops for   relief.              Comments     Daughter of Rosalind Brady (Former patient)     1.PCIOL OU ([facility])   2.YAG OU ([facility])   3.8 cut RK Sx w/ multiple AK Sutures OU ([facility])   Patient saw Dr. Blele not a candidate for corneal transplant  4.Irregular Astigmatism   5. Blepharoplasty 9/15   6. FHx COAG, mother       Systane PRN OU            Last edited by Kiko Oakes on 4/4/2022  3:23 PM. (History)         Assessment /Plan :  1. Open angle with borderline findings and low glaucoma risk in both eyes No evidence of glaucoma at this time but based on risk factors recommend to continue monitoring.    Return to clinic in 1 year  or as needed.  With GOCT, Dilation and HVF 24-2       2. Family history of glaucoma    3. Pseudophakia  -- Condition stable, no therapeutic change required. Monitoring routinely.     4. Dry eye - continue the use of the artificial tears in both eyes   5. History of refractive surgery - consult Dr. Belle, patient will contact Dr. Belle's office to schedule an appointment

## 2022-05-04 ENCOUNTER — OFFICE VISIT (OUTPATIENT)
Dept: OPHTHALMOLOGY | Facility: CLINIC | Age: 67
End: 2022-05-04
Payer: MEDICARE

## 2022-05-04 DIAGNOSIS — H52.213 IRREGULAR ASTIGMATISM OF BOTH EYES: Primary | ICD-10-CM

## 2022-05-04 DIAGNOSIS — Z98.890 HISTORY OF REFRACTIVE SURGERY: ICD-10-CM

## 2022-05-04 DIAGNOSIS — Z96.1 PSEUDOPHAKIA: ICD-10-CM

## 2022-05-04 PROCEDURE — 1160F PR REVIEW ALL MEDS BY PRESCRIBER/CLIN PHARMACIST DOCUMENTED: ICD-10-PCS | Mod: CPTII,S$GLB,, | Performed by: OPTOMETRIST

## 2022-05-04 PROCEDURE — 1159F MED LIST DOCD IN RCRD: CPT | Mod: CPTII,S$GLB,, | Performed by: OPTOMETRIST

## 2022-05-04 PROCEDURE — 1160F RVW MEDS BY RX/DR IN RCRD: CPT | Mod: CPTII,S$GLB,, | Performed by: OPTOMETRIST

## 2022-05-04 PROCEDURE — 92025 CORNEAL TOPOGRAPHY - OU - BOTH EYES: ICD-10-PCS | Mod: S$GLB,,, | Performed by: OPTOMETRIST

## 2022-05-04 PROCEDURE — 92002 INTRM OPH EXAM NEW PATIENT: CPT | Mod: S$GLB,,, | Performed by: OPTOMETRIST

## 2022-05-04 PROCEDURE — 92002 PR EYE EXAM, NEW PATIENT,INTERMED: ICD-10-PCS | Mod: S$GLB,,, | Performed by: OPTOMETRIST

## 2022-05-04 PROCEDURE — 92025 CPTRIZED CORNEAL TOPOGRAPHY: CPT | Mod: S$GLB,,, | Performed by: OPTOMETRIST

## 2022-05-04 PROCEDURE — 1159F PR MEDICATION LIST DOCUMENTED IN MEDICAL RECORD: ICD-10-PCS | Mod: CPTII,S$GLB,, | Performed by: OPTOMETRIST

## 2022-05-04 PROCEDURE — 99999 PR PBB SHADOW E&M-EST. PATIENT-LVL III: ICD-10-PCS | Mod: PBBFAC,,, | Performed by: OPTOMETRIST

## 2022-05-04 PROCEDURE — 99999 PR PBB SHADOW E&M-EST. PATIENT-LVL III: CPT | Mod: PBBFAC,,, | Performed by: OPTOMETRIST

## 2022-05-04 NOTE — PROGRESS NOTES
HPI     NP to DKT  Referred by CPG  Patient here today for possible RGP fit     Perpendicular IOP's    1.PCIOL OU ([facility]es)   2.YAG OU ([facility]es)   3.8 cut RK Sx w/ multiple AK Sutures OU ([facility])   Patient saw Dr. Belle not a candidate for corneal transplant  4.Irregular Astigmatism   5. Blepharoplasty 9/15   6. COAG suspect +FHx (mother + son)      Systane PRN OU    Last edited by Lina Orantes, PCT on 5/4/2022  2:56 PM. (History)            Assessment /Plan     For exam results, see Encounter Report.    Irregular astigmatism of both eyes  S/p RK OU, not good candidate for PKP per Dr Belle. Will plan for scleral lens potential next available OU.     History of refractive surgery  See above.     Pseudophakia  S/p CE/IOL at [facility], RT next available for scleral lens potential     RTC at [facility] for scleral lens potential and Dr EDWARDS as scheduled.

## 2022-05-10 ENCOUNTER — OFFICE VISIT (OUTPATIENT)
Dept: OPHTHALMOLOGY | Facility: CLINIC | Age: 67
End: 2022-05-10
Payer: MEDICARE

## 2022-05-10 DIAGNOSIS — H40.013 OPEN ANGLE WITH BORDERLINE FINDINGS AND LOW GLAUCOMA RISK IN BOTH EYES: ICD-10-CM

## 2022-05-10 DIAGNOSIS — H04.129 DRY EYE: ICD-10-CM

## 2022-05-10 DIAGNOSIS — H52.213 IRREGULAR ASTIGMATISM OF BOTH EYES: Primary | ICD-10-CM

## 2022-05-10 PROCEDURE — 92310 PR CONTACT LENS FITTING (NO CHANGE): ICD-10-PCS | Mod: CSM,S$GLB,, | Performed by: OPTOMETRIST

## 2022-05-10 PROCEDURE — 1160F RVW MEDS BY RX/DR IN RCRD: CPT | Mod: CPTII,S$GLB,, | Performed by: OPTOMETRIST

## 2022-05-10 PROCEDURE — 99999 PR PBB SHADOW E&M-EST. PATIENT-LVL II: ICD-10-PCS | Mod: PBBFAC,,, | Performed by: OPTOMETRIST

## 2022-05-10 PROCEDURE — 99213 PR OFFICE/OUTPT VISIT, EST, LEVL III, 20-29 MIN: ICD-10-PCS | Mod: S$GLB,,, | Performed by: OPTOMETRIST

## 2022-05-10 PROCEDURE — 92310 CONTACT LENS FITTING OU: CPT | Mod: CSM,S$GLB,, | Performed by: OPTOMETRIST

## 2022-05-10 PROCEDURE — 1160F PR REVIEW ALL MEDS BY PRESCRIBER/CLIN PHARMACIST DOCUMENTED: ICD-10-PCS | Mod: CPTII,S$GLB,, | Performed by: OPTOMETRIST

## 2022-05-10 PROCEDURE — 99999 PR PBB SHADOW E&M-EST. PATIENT-LVL II: CPT | Mod: PBBFAC,,, | Performed by: OPTOMETRIST

## 2022-05-10 PROCEDURE — 1159F MED LIST DOCD IN RCRD: CPT | Mod: CPTII,S$GLB,, | Performed by: OPTOMETRIST

## 2022-05-10 PROCEDURE — 1159F PR MEDICATION LIST DOCUMENTED IN MEDICAL RECORD: ICD-10-PCS | Mod: CPTII,S$GLB,, | Performed by: OPTOMETRIST

## 2022-05-10 PROCEDURE — 99213 OFFICE O/P EST LOW 20 MIN: CPT | Mod: S$GLB,,, | Performed by: OPTOMETRIST

## 2022-05-10 NOTE — PROGRESS NOTES
HPI     Last seen by DKT  Patient here today for CTL fit  Today eyes are itchy and irritated     Perpendicular IOP's    1.PCIOL OU ([facility]es)   2.YAG OU ([facility]es)   3.8 cut RK Sx w/ multiple AK Sutures OU ([facility])   Patient saw Dr. Belle not a candidate for corneal transplant  4.Irregular Astigmatism   5. Blepharoplasty 9/15   6. COAG suspect +FHx (mother + son)      Systane PRN OU    Last edited by Lina Orantes, PCT on 5/10/2022 11:13 AM. (History)              Assessment /Plan     For exam results, see Encounter Report.    Irregular astigmatism of both eyes    Contact Lens Final Rx     Final Contact Lens Rx       Brand Base Curve Diameter Sphere Cylinder Axis Lens Addl. Specs    Right Zenlens z-21 9.70 17.0 +0.50 -1.50 165 [facility] XO2 Sag 4875    Left Zenlens z-21 9.70 17.0 +1.50   [facility] XO2 Sag 4875    Expiration Date: 5/10/2023    Replacement: Yearly    Solutions: [facility]    Wearing Schedule: Daily Wear   OD SAG 4875 Standard APS  OS SAG 4875 Standard APS                Responded well to OU distance Scleral CTL fitted in office today. Information forwarded to [facility] for coverage of medically necessary contact lenses.     Open angle with borderline findings and low glaucoma risk in both eyes  Continue per Dr CPG.     Dry eye  Recommend systane drops on top of ctl PRN.     RTC once CTL arrives for dispense and fitting.

## 2022-05-10 NOTE — LETTER
May 10, 2022    Tammy Mackenzie  [address] [place] [address]             [facility]  [address] [place] [address]  Phone: [phone]  Fax: [phone] Re: Tammy Mackenzie  MRN: [id number]  YOB: 1955    05/10/2022    To Whom It May Concern:    Tammy Mackenzie has a diagnosis   irregular astigmatism (ICD-10 H52.219).  The cornea acts like the windowpane to the eye.  If this windowpane is not smooth, the light will not bend evenly and an irregular image will be formed - similar to looking through bumpy glass.    Contact lenses cover irregular protrusions on the cornea and make a new smooth surface for the light to bend through.  In many cases a special design must be used.  The back surface of the lens is designed to fit the contour of the protrusion, much like fitting a hat to a head.  It is important for a patient  With irregular astigmatism to follow-up with her eye doctor to make sure there has been no change to the cornea or contact lens.    The contact lenses related services such as Contact Lens, GP, Scleral, Per Lens (CPT Code  ) are medical necessary services.  They should be a covered benefit under the patient's medical health plan.    Please let me know if you have any questions.    Sincerely,    Naveen Hill OD

## 2022-05-10 NOTE — Clinical Note
Hey guys I believe everything is attached. She requries front surface toric scleral brien lenses in both eyes due to her RK surgery. Please let me know if I am missing any documentation. Elisa if you can see if her vision plan covers the CTL, if so Paula can verify. If not she is greatly interested in the CTL regardless so she would like to know her OOP costs.   Please let me know if I am missing anything.

## 2022-06-13 ENCOUNTER — OFFICE VISIT (OUTPATIENT)
Dept: OPHTHALMOLOGY | Facility: CLINIC | Age: 67
End: 2022-06-13
Payer: MEDICARE

## 2022-06-13 DIAGNOSIS — H52.213 IRREGULAR ASTIGMATISM OF BOTH EYES: Primary | ICD-10-CM

## 2022-06-13 PROCEDURE — 99499 NO LOS: ICD-10-PCS | Mod: S$GLB,,, | Performed by: OPTOMETRIST

## 2022-06-13 PROCEDURE — 92499 PR CONTACT LENS F/U LEV 1: ICD-10-PCS | Mod: ,,, | Performed by: OPTOMETRIST

## 2022-06-13 PROCEDURE — 99499 UNLISTED E&M SERVICE: CPT | Mod: S$GLB,,, | Performed by: OPTOMETRIST

## 2022-06-13 PROCEDURE — 1159F PR MEDICATION LIST DOCUMENTED IN MEDICAL RECORD: ICD-10-PCS | Mod: CPTII,S$GLB,, | Performed by: OPTOMETRIST

## 2022-06-13 PROCEDURE — 92499 UNLISTED OPH SVC/PROCEDURE: CPT | Mod: ,,, | Performed by: OPTOMETRIST

## 2022-06-13 PROCEDURE — 1159F MED LIST DOCD IN RCRD: CPT | Mod: CPTII,S$GLB,, | Performed by: OPTOMETRIST

## 2022-06-13 NOTE — PROGRESS NOTES
HPI     Contact Lens Follow Up     Comments: F/u RGP              Comments     I/R instruction today - feels okay and va seems fairly clear per patient   66 year old female presents for scleral lens dispense.           Last edited by Naveen Hill, OD on 6/13/2022 11:58 AM. (History)            Assessment /Plan     For exam results, see Encounter Report.    Irregular astigmatism of both eyes    Scleral lens dispensed today, patient will wear lenses for 7-10 days.   OU distance, patient will require OTC readers for near tasks.   RTC 10 days for follow up, sooner if any changes to vision or worsening symptoms.   Contact Lens Final Rx     Final Contact Lens Rx       Brand Base Curve Diameter Sphere Cylinder Axis Lens Addl. Specs    Right Zenlens 9.70 17.0 +0.50 -1.50 165 [employer] XO2 Sag 4875    Left Zenlens 9.70 17.0 +1.50   [employer] XO2 Sag 4875

## 2022-06-28 ENCOUNTER — OFFICE VISIT (OUTPATIENT)
Dept: OPHTHALMOLOGY | Facility: CLINIC | Age: 67
End: 2022-06-28
Payer: MEDICARE

## 2022-06-28 DIAGNOSIS — H18.822 CORNEAL ABRASION DUE TO CONTACT LENS, LEFT: Primary | ICD-10-CM

## 2022-06-28 DIAGNOSIS — H52.213 IRREGULAR ASTIGMATISM OF BOTH EYES: ICD-10-CM

## 2022-06-28 PROCEDURE — 99499 UNLISTED E&M SERVICE: CPT | Mod: S$GLB,,, | Performed by: OPTOMETRIST

## 2022-06-28 PROCEDURE — 1160F PR REVIEW ALL MEDS BY PRESCRIBER/CLIN PHARMACIST DOCUMENTED: ICD-10-PCS | Mod: CPTII,S$GLB,, | Performed by: OPTOMETRIST

## 2022-06-28 PROCEDURE — 1159F MED LIST DOCD IN RCRD: CPT | Mod: CPTII,S$GLB,, | Performed by: OPTOMETRIST

## 2022-06-28 PROCEDURE — 99999 PR PBB SHADOW E&M-EST. PATIENT-LVL II: ICD-10-PCS | Mod: PBBFAC,,, | Performed by: OPTOMETRIST

## 2022-06-28 PROCEDURE — 92499 PR CONTACT LENS F/U LEV 1: ICD-10-PCS | Mod: ,,, | Performed by: OPTOMETRIST

## 2022-06-28 PROCEDURE — 1160F RVW MEDS BY RX/DR IN RCRD: CPT | Mod: CPTII,S$GLB,, | Performed by: OPTOMETRIST

## 2022-06-28 PROCEDURE — 1159F PR MEDICATION LIST DOCUMENTED IN MEDICAL RECORD: ICD-10-PCS | Mod: CPTII,S$GLB,, | Performed by: OPTOMETRIST

## 2022-06-28 PROCEDURE — 92499 UNLISTED OPH SVC/PROCEDURE: CPT | Mod: ,,, | Performed by: OPTOMETRIST

## 2022-06-28 PROCEDURE — 99999 PR PBB SHADOW E&M-EST. PATIENT-LVL II: CPT | Mod: PBBFAC,,, | Performed by: OPTOMETRIST

## 2022-06-28 PROCEDURE — 99499 NO LOS: ICD-10-PCS | Mod: S$GLB,,, | Performed by: OPTOMETRIST

## 2022-06-28 RX ORDER — MOXIFLOXACIN 5 MG/ML
1 SOLUTION/ DROPS OPHTHALMIC 4 TIMES DAILY
Qty: 3 ML | Refills: 0 | Status: SHIPPED | OUTPATIENT
Start: 2022-06-28 | End: 2022-07-03

## 2022-06-28 NOTE — PROGRESS NOTES
HPI     Contact Lens Fit     Comments: Pt is here for CTL f/u. Pt states VA is great, pain in OS and   no occular issues.          Last edited by Aracelis Hsu MA on 6/28/2022 10:42 AM. (History)            Assessment /Plan     For exam results, see Encounter Report.    Corneal abrasion due to contact lens, left  -     moxifloxacin (VIGAMOX) 0.5 % ophthalmic solution; Place 1 drop into the left eye 4 (four) times daily. for 5 days  Dispense: 3 mL; Refill: 0  Small corneal abrasion noted in left eye, stop CTL wear, start prophylactic moxifloxacin qid x 5 days. RTC when new scleral CTL arrives for dispense.     Irregular astigmatism of both eyes  Doing well scleral CTL. Ordered additional OS lens with larger vault and over-refraction today. Call patient when lenses arrive for dispense.

## 2022-07-01 ENCOUNTER — OFFICE VISIT (OUTPATIENT)
Dept: OPHTHALMOLOGY | Facility: CLINIC | Age: 67
End: 2022-07-01
Payer: MEDICARE

## 2022-07-01 DIAGNOSIS — H18.822 CORNEAL ABRASION DUE TO CONTACT LENS, LEFT: Primary | ICD-10-CM

## 2022-07-01 PROCEDURE — 1159F PR MEDICATION LIST DOCUMENTED IN MEDICAL RECORD: ICD-10-PCS | Mod: CPTII,S$GLB,, | Performed by: OPTOMETRIST

## 2022-07-01 PROCEDURE — 99499 NO LOS: ICD-10-PCS | Mod: S$GLB,,, | Performed by: OPTOMETRIST

## 2022-07-01 PROCEDURE — 99999 PR PBB SHADOW E&M-EST. PATIENT-LVL I: ICD-10-PCS | Mod: PBBFAC,,, | Performed by: OPTOMETRIST

## 2022-07-01 PROCEDURE — 99999 PR PBB SHADOW E&M-EST. PATIENT-LVL I: CPT | Mod: PBBFAC,,, | Performed by: OPTOMETRIST

## 2022-07-01 PROCEDURE — 1159F MED LIST DOCD IN RCRD: CPT | Mod: CPTII,S$GLB,, | Performed by: OPTOMETRIST

## 2022-07-01 PROCEDURE — 99499 UNLISTED E&M SERVICE: CPT | Mod: S$GLB,,, | Performed by: OPTOMETRIST

## 2022-07-01 NOTE — PROGRESS NOTES
HPI     Contact Lens Follow Up     Comments: Pt states VA is stable, no pain just discomfort OS. Pt is using   her drops. PT states no occular issues.           Last edited by Aracelis Hsu MA on 7/1/2022  9:13 AM. (History)            Assessment /Plan     For exam results, see Encounter Report.    Corneal abrasion due to contact lens, left    Improving, continue Vigamox 4 times daily, RTC Wednesday July 6 for f/u and CTL dispense.

## 2022-07-06 ENCOUNTER — OFFICE VISIT (OUTPATIENT)
Dept: OPHTHALMOLOGY | Facility: CLINIC | Age: 67
End: 2022-07-06
Payer: MEDICARE

## 2022-07-06 DIAGNOSIS — H52.213 IRREGULAR ASTIGMATISM OF BOTH EYES: Primary | ICD-10-CM

## 2022-07-06 DIAGNOSIS — H18.822 CORNEAL ABRASION DUE TO CONTACT LENS, LEFT: ICD-10-CM

## 2022-07-06 PROCEDURE — 1159F MED LIST DOCD IN RCRD: CPT | Mod: CPTII,S$GLB,, | Performed by: OPTOMETRIST

## 2022-07-06 PROCEDURE — 1159F PR MEDICATION LIST DOCUMENTED IN MEDICAL RECORD: ICD-10-PCS | Mod: CPTII,S$GLB,, | Performed by: OPTOMETRIST

## 2022-07-06 PROCEDURE — 99213 PR OFFICE/OUTPT VISIT, EST, LEVL III, 20-29 MIN: ICD-10-PCS | Mod: S$GLB,,, | Performed by: OPTOMETRIST

## 2022-07-06 PROCEDURE — 99999 PR PBB SHADOW E&M-EST. PATIENT-LVL II: ICD-10-PCS | Mod: PBBFAC,,, | Performed by: OPTOMETRIST

## 2022-07-06 PROCEDURE — 99213 OFFICE O/P EST LOW 20 MIN: CPT | Mod: S$GLB,,, | Performed by: OPTOMETRIST

## 2022-07-06 PROCEDURE — 99999 PR PBB SHADOW E&M-EST. PATIENT-LVL II: CPT | Mod: PBBFAC,,, | Performed by: OPTOMETRIST

## 2022-07-06 NOTE — PROGRESS NOTES
HPI     Last seen by IRAMT on 7/1/22  Patient here today for CTL and Corneal abrasion OS follow up  Patient using Vigamox QID OS  Patient states OD feels a little irritated today currently wearing lens OD  Patient states OS feels much better no pain or discomfort using drops as   directed    Last edited by Lina Orantes, PCT on 7/6/2022 10:27 AM. (History)            Assessment /Plan     For exam results, see Encounter Report.    Irregular astigmatism of both eyes  Scleral lens OS dispensed today, fitting well. CTL hygiene reviewed.     Corneal abrasion due to contact lens, left  Resolved, stop Vigamox today, new flatter OS CTL dispensed today.     RTC 1 week for CL f/u, sooner if any changes to vision or worsening symptoms.

## 2022-07-07 ENCOUNTER — PATIENT MESSAGE (OUTPATIENT)
Dept: OPHTHALMOLOGY | Facility: CLINIC | Age: 67
End: 2022-07-07
Payer: MEDICARE

## 2022-07-14 ENCOUNTER — OFFICE VISIT (OUTPATIENT)
Dept: OPHTHALMOLOGY | Facility: CLINIC | Age: 67
End: 2022-07-14
Payer: MEDICARE

## 2022-07-14 DIAGNOSIS — H16.403 CORNEAL NEOVASCULARIZATION OF BOTH EYES: ICD-10-CM

## 2022-07-14 DIAGNOSIS — H52.213 IRREGULAR ASTIGMATISM OF BOTH EYES: Primary | ICD-10-CM

## 2022-07-14 PROCEDURE — 99999 PR PBB SHADOW E&M-EST. PATIENT-LVL II: CPT | Mod: PBBFAC,,, | Performed by: OPTOMETRIST

## 2022-07-14 PROCEDURE — 99999 PR PBB SHADOW E&M-EST. PATIENT-LVL II: ICD-10-PCS | Mod: PBBFAC,,, | Performed by: OPTOMETRIST

## 2022-07-14 PROCEDURE — 99499 NO LOS: ICD-10-PCS | Mod: S$GLB,,, | Performed by: OPTOMETRIST

## 2022-07-14 PROCEDURE — 1159F PR MEDICATION LIST DOCUMENTED IN MEDICAL RECORD: ICD-10-PCS | Mod: CPTII,S$GLB,, | Performed by: OPTOMETRIST

## 2022-07-14 PROCEDURE — 1126F PR PAIN SEVERITY QUANTIFIED, NO PAIN PRESENT: ICD-10-PCS | Mod: CPTII,S$GLB,, | Performed by: OPTOMETRIST

## 2022-07-14 PROCEDURE — 1159F MED LIST DOCD IN RCRD: CPT | Mod: CPTII,S$GLB,, | Performed by: OPTOMETRIST

## 2022-07-14 PROCEDURE — 99499 UNLISTED E&M SERVICE: CPT | Mod: S$GLB,,, | Performed by: OPTOMETRIST

## 2022-07-14 PROCEDURE — 1126F AMNT PAIN NOTED NONE PRSNT: CPT | Mod: CPTII,S$GLB,, | Performed by: OPTOMETRIST

## 2022-07-14 NOTE — PROGRESS NOTES
"HPI     Patient here for CTL follow-up     Patient states VA stable. Patient states Monday she thinks she scratched   OD while taking the CTL out. Patient states she feels a burning sensation.   Patient states she see a "silver" floater OU only with CTL in. No other   ocular complaints at this time.     Last edited by Luciana Hastings MA on 7/14/2022  1:46 PM. (History)            Assessment /Plan     For exam results, see Encounter Report.    Irregular astigmatism of both eyes  Doing well with scleral lens. Continue current lens modality. CTL hygiene reviewed.   Corneal neovascularization of both eyes  Max wear time for scleral lens 10 hours. Observe closely.     RTC with Dr Jones as scheduled sooner if any changes to vision or worsening symptoms.                  "

## 2022-08-01 ENCOUNTER — PATIENT MESSAGE (OUTPATIENT)
Dept: OPTOMETRY | Facility: CLINIC | Age: 67
End: 2022-08-01
Payer: MEDICARE

## 2022-11-15 ENCOUNTER — OFFICE VISIT (OUTPATIENT)
Dept: OPHTHALMOLOGY | Facility: CLINIC | Age: 67
End: 2022-11-15
Payer: MEDICARE

## 2022-11-15 DIAGNOSIS — Z97.3 RED EYE ASSOCIATED WITH CONTACT LENS: Primary | ICD-10-CM

## 2022-11-15 DIAGNOSIS — H57.89 RED EYE ASSOCIATED WITH CONTACT LENS: Primary | ICD-10-CM

## 2022-11-15 DIAGNOSIS — H52.213 IRREGULAR ASTIGMATISM OF BOTH EYES: ICD-10-CM

## 2022-11-15 PROCEDURE — 99999 PR PBB SHADOW E&M-EST. PATIENT-LVL II: CPT | Mod: PBBFAC,,, | Performed by: OPTOMETRIST

## 2022-11-15 PROCEDURE — 1159F PR MEDICATION LIST DOCUMENTED IN MEDICAL RECORD: ICD-10-PCS | Mod: CPTII,S$GLB,, | Performed by: OPTOMETRIST

## 2022-11-15 PROCEDURE — 99213 OFFICE O/P EST LOW 20 MIN: CPT | Mod: S$GLB,,, | Performed by: OPTOMETRIST

## 2022-11-15 PROCEDURE — 1159F MED LIST DOCD IN RCRD: CPT | Mod: CPTII,S$GLB,, | Performed by: OPTOMETRIST

## 2022-11-15 PROCEDURE — 99999 PR PBB SHADOW E&M-EST. PATIENT-LVL II: ICD-10-PCS | Mod: PBBFAC,,, | Performed by: OPTOMETRIST

## 2022-11-15 PROCEDURE — 99213 PR OFFICE/OUTPT VISIT, EST, LEVL III, 20-29 MIN: ICD-10-PCS | Mod: S$GLB,,, | Performed by: OPTOMETRIST

## 2022-11-15 RX ORDER — METHYLPREDNISOLONE 4 MG/1
1 TABLET ORAL
COMMUNITY
Start: 2022-10-17

## 2022-11-15 RX ORDER — CYCLOBENZAPRINE HCL 10 MG
10 TABLET ORAL NIGHTLY
COMMUNITY
Start: 2022-09-13

## 2022-11-15 RX ORDER — MOXIFLOXACIN 5 MG/ML
1 SOLUTION/ DROPS OPHTHALMIC 3 TIMES DAILY
Qty: 1.4 ML | Refills: 0 | Status: SHIPPED | OUTPATIENT
Start: 2022-11-15 | End: 2022-11-22

## 2022-11-15 RX ORDER — KETOROLAC TROMETHAMINE 10 MG/1
10 TABLET, FILM COATED ORAL 3 TIMES DAILY
COMMUNITY
Start: 2022-10-17

## 2022-11-15 RX ORDER — MONTELUKAST SODIUM 4 MG/1
1 TABLET, CHEWABLE ORAL EVERY MORNING
COMMUNITY
Start: 2022-08-28

## 2022-11-16 NOTE — PROGRESS NOTES
HPI     Eye Problem            Comments: Patient is here for problem with scleral lens           Comments    Patient states pain OD that started about 5 days ago. Patient states VA   has been blurry while wearing scleral lens. Patient states OS is dry. No   other ocular complaints at this time.           Last edited by Luciana Hastings MA on 11/15/2022  2:53 PM.            Assessment /Plan     For exam results, see Encounter Report.    Red eye associated with contact lens  -     moxifloxacin (VIGAMOX) 0.5 % ophthalmic solution; Place 1 drop into both eyes 3 (three) times daily. for 7 days  Dispense: 1.4 mL; Refill: 0  CTL holiday OD, start prophylactic moxi tid x 7 days. OK to resume CTL wear after 7 days. If any issues RTC and consider higher vaulted lens OD>     Irregular astigmatism of both eyes  S/p RK with toric PCIOL, doing well with scleral lens. Continue current modality.     RTC as scheduled with Dr EDWARDS for annual exam sooner if any changes to vision or worsening symptoms.

## 2023-04-22 ENCOUNTER — PATIENT MESSAGE (OUTPATIENT)
Dept: OPHTHALMOLOGY | Facility: CLINIC | Age: 68
End: 2023-04-22
Payer: MEDICARE

## 2023-04-27 ENCOUNTER — OFFICE VISIT (OUTPATIENT)
Dept: OPHTHALMOLOGY | Facility: CLINIC | Age: 68
End: 2023-04-27
Payer: MEDICARE

## 2023-04-27 DIAGNOSIS — H40.013 OPEN ANGLE WITH BORDERLINE FINDINGS AND LOW GLAUCOMA RISK IN BOTH EYES: Primary | ICD-10-CM

## 2023-04-27 DIAGNOSIS — Z96.1 PSEUDOPHAKIA: ICD-10-CM

## 2023-04-27 DIAGNOSIS — H04.129 DRY EYE: ICD-10-CM

## 2023-04-27 PROCEDURE — 1159F MED LIST DOCD IN RCRD: CPT | Mod: CPTII,S$GLB,, | Performed by: OPHTHALMOLOGY

## 2023-04-27 PROCEDURE — 99999 PR PBB SHADOW E&M-EST. PATIENT-LVL III: CPT | Mod: PBBFAC,,, | Performed by: OPHTHALMOLOGY

## 2023-04-27 PROCEDURE — 99214 PR OFFICE/OUTPT VISIT, EST, LEVL IV, 30-39 MIN: ICD-10-PCS | Mod: S$GLB,,, | Performed by: OPHTHALMOLOGY

## 2023-04-27 PROCEDURE — 92083 HUMPHREY VISUAL FIELD - OU - BOTH EYES: ICD-10-PCS | Mod: S$GLB,,, | Performed by: OPHTHALMOLOGY

## 2023-04-27 PROCEDURE — 1159F PR MEDICATION LIST DOCUMENTED IN MEDICAL RECORD: ICD-10-PCS | Mod: CPTII,S$GLB,, | Performed by: OPHTHALMOLOGY

## 2023-04-27 PROCEDURE — 92133 POSTERIOR SEGMENT OCT OPTIC NERVE(OCULAR COHERENCE TOMOGRAPHY) - OU - BOTH EYES: ICD-10-PCS | Mod: S$GLB,,, | Performed by: OPHTHALMOLOGY

## 2023-04-27 PROCEDURE — 92133 CPTRZD OPH DX IMG PST SGM ON: CPT | Mod: S$GLB,,, | Performed by: OPHTHALMOLOGY

## 2023-04-27 PROCEDURE — 99214 OFFICE O/P EST MOD 30 MIN: CPT | Mod: S$GLB,,, | Performed by: OPHTHALMOLOGY

## 2023-04-27 PROCEDURE — 1160F RVW MEDS BY RX/DR IN RCRD: CPT | Mod: CPTII,S$GLB,, | Performed by: OPHTHALMOLOGY

## 2023-04-27 PROCEDURE — 92083 EXTENDED VISUAL FIELD XM: CPT | Mod: S$GLB,,, | Performed by: OPHTHALMOLOGY

## 2023-04-27 PROCEDURE — 1160F PR REVIEW ALL MEDS BY PRESCRIBER/CLIN PHARMACIST DOCUMENTED: ICD-10-PCS | Mod: CPTII,S$GLB,, | Performed by: OPHTHALMOLOGY

## 2023-04-27 PROCEDURE — 99999 PR PBB SHADOW E&M-EST. PATIENT-LVL III: ICD-10-PCS | Mod: PBBFAC,,, | Performed by: OPHTHALMOLOGY

## 2023-04-27 RX ORDER — FLUOROMETHOLONE 1 MG/ML
1 SUSPENSION/ DROPS OPHTHALMIC 3 TIMES DAILY PRN
Qty: 10 ML | Refills: 3 | Status: SHIPPED | OUTPATIENT
Start: 2023-04-27 | End: 2023-05-05 | Stop reason: SDUPTHER

## 2023-04-27 NOTE — PROGRESS NOTES
SUBJECTIVE  Tammy Mackenzie is 67 y.o. female  Corrected distance visual acuity was 20/60 in the right eye and 20/60 in the left eye.   Chief Complaint   Patient presents with    Glaucoma     HVF GOCT and dilation          HPI     Glaucoma     Additional comments: HVF GOCT and dilation           Comments    States that her vision has gotten worse since she was last seen. States   that her eyes recently have been red and itchy worse in her OS. States   that last month she was seeing dark spots on the ground when she was   looking at the grass that lasted a few hours but it went away.     1.PCIOL OU ([facility])   2.YAG OU ([facility])   3.8 cut RK Sx w/ multiple AK Sutures OU ([facility])   Patient saw Dr. Belle not a candidate for corneal transplant  4.Irregular Astigmatism   5. Blepharoplasty 9/15   6. COAG suspect +FHx (mother + son)      Systane PRN OU            Last edited by Renée Zavala on 4/27/2023  1:52 PM.         Assessment /Plan :  1. Open angle with borderline findings and low glaucoma risk in both eyes No evidence of glaucoma at this time but based on risk factors recommend to continue monitoring.    Return to clinic in 1 year  or as needed.  With GOCT, Dilation, and HVF 24-2     2. Dry eye Findings and symptoms consistent with moderate dry eyes. Dry eye instruction sheet reviewed with patient recommending:AT's qid/titrate prn and FML 0.1% as needed due to increased irritation.     3. Pseudophakia  -- Condition stable, no therapeutic change required. Monitoring routinely.

## 2023-04-28 ENCOUNTER — OFFICE VISIT (OUTPATIENT)
Dept: OPHTHALMOLOGY | Facility: CLINIC | Age: 68
End: 2023-04-28
Payer: MEDICARE

## 2023-04-28 DIAGNOSIS — H16.403 CORNEAL NEOVASCULARIZATION OF BOTH EYES: Primary | ICD-10-CM

## 2023-04-28 DIAGNOSIS — Z97.3 RED EYE ASSOCIATED WITH CONTACT LENS: ICD-10-CM

## 2023-04-28 DIAGNOSIS — H57.89 RED EYE ASSOCIATED WITH CONTACT LENS: ICD-10-CM

## 2023-04-28 DIAGNOSIS — H52.213 IRREGULAR ASTIGMATISM OF BOTH EYES: ICD-10-CM

## 2023-04-28 PROCEDURE — 99999 PR PBB SHADOW E&M-EST. PATIENT-LVL III: CPT | Mod: PBBFAC,,, | Performed by: OPTOMETRIST

## 2023-04-28 PROCEDURE — 99213 PR OFFICE/OUTPT VISIT, EST, LEVL III, 20-29 MIN: ICD-10-PCS | Mod: S$GLB,,, | Performed by: OPTOMETRIST

## 2023-04-28 PROCEDURE — 99999 PR PBB SHADOW E&M-EST. PATIENT-LVL III: ICD-10-PCS | Mod: PBBFAC,,, | Performed by: OPTOMETRIST

## 2023-04-28 PROCEDURE — 99213 OFFICE O/P EST LOW 20 MIN: CPT | Mod: S$GLB,,, | Performed by: OPTOMETRIST

## 2023-04-28 PROCEDURE — 1159F PR MEDICATION LIST DOCUMENTED IN MEDICAL RECORD: ICD-10-PCS | Mod: CPTII,S$GLB,, | Performed by: OPTOMETRIST

## 2023-04-28 PROCEDURE — 1159F MED LIST DOCD IN RCRD: CPT | Mod: CPTII,S$GLB,, | Performed by: OPTOMETRIST

## 2023-04-28 RX ORDER — METFORMIN HYDROCHLORIDE 500 MG/1
TABLET, EXTENDED RELEASE ORAL
COMMUNITY
Start: 2023-04-20

## 2023-04-28 RX ORDER — FLUOXETINE HYDROCHLORIDE 20 MG/1
CAPSULE ORAL
COMMUNITY
Start: 2023-04-03

## 2023-04-28 RX ORDER — CEPHALEXIN 500 MG/1
CAPSULE ORAL
COMMUNITY
Start: 2022-11-03

## 2023-04-28 RX ORDER — BENZONATATE 200 MG/1
CAPSULE ORAL
COMMUNITY
Start: 2022-12-06

## 2023-04-28 RX ORDER — ROSUVASTATIN CALCIUM 10 MG/1
TABLET, COATED ORAL
COMMUNITY
Start: 2023-04-20

## 2023-05-01 NOTE — PROGRESS NOTES
HPI     Contact Lens Follow Up            Comments: Patient reports for CTL f/u. Patient states [facility] referred her to   check VA in OU. Patient denies pain but does have discomfort OU. Patient   states when she takes the lens out at night they are very dry with no   liquid in them. Patient states last weekend she was experiencing pain OU   and redness .           Comments    CTL f/u OU. [facility] prescribed FML 04/27 PRN.           Last edited by Luciana Hastings MA on 4/28/2023  2:20 PM.            Assessment /Plan     For exam results, see Encounter Report.    Corneal neovascularization of both eyes  Discontinue CTL wear, continue FML QID x 7 days. RTC 7 days for CTL refitting.     Red eye associated with contact lens  See above.     Irregular astigmatism of both eyes  S/p RK surgery with toric IOL, recommend scleral lens refitting x7 days due to amount of corneal pannus.     RTC 7 days for corneal f/u sooner if any chagnes to vision or worsening symptoms.

## 2023-05-05 ENCOUNTER — OFFICE VISIT (OUTPATIENT)
Dept: OPHTHALMOLOGY | Facility: CLINIC | Age: 68
End: 2023-05-05
Payer: MEDICARE

## 2023-05-05 DIAGNOSIS — H57.89 RED EYE ASSOCIATED WITH CONTACT LENS: Primary | ICD-10-CM

## 2023-05-05 DIAGNOSIS — H16.403 CORNEAL NEOVASCULARIZATION OF BOTH EYES: ICD-10-CM

## 2023-05-05 DIAGNOSIS — H52.213 IRREGULAR ASTIGMATISM OF BOTH EYES: ICD-10-CM

## 2023-05-05 DIAGNOSIS — Z97.3 RED EYE ASSOCIATED WITH CONTACT LENS: Primary | ICD-10-CM

## 2023-05-05 PROCEDURE — 1159F PR MEDICATION LIST DOCUMENTED IN MEDICAL RECORD: ICD-10-PCS | Mod: CPTII,S$GLB,, | Performed by: OPTOMETRIST

## 2023-05-05 PROCEDURE — 1159F MED LIST DOCD IN RCRD: CPT | Mod: CPTII,S$GLB,, | Performed by: OPTOMETRIST

## 2023-05-05 PROCEDURE — 99213 PR OFFICE/OUTPT VISIT, EST, LEVL III, 20-29 MIN: ICD-10-PCS | Mod: S$GLB,,, | Performed by: OPTOMETRIST

## 2023-05-05 PROCEDURE — 99213 OFFICE O/P EST LOW 20 MIN: CPT | Mod: S$GLB,,, | Performed by: OPTOMETRIST

## 2023-05-05 PROCEDURE — 99999 PR PBB SHADOW E&M-EST. PATIENT-LVL III: CPT | Mod: PBBFAC,,, | Performed by: OPTOMETRIST

## 2023-05-05 PROCEDURE — 99999 PR PBB SHADOW E&M-EST. PATIENT-LVL III: ICD-10-PCS | Mod: PBBFAC,,, | Performed by: OPTOMETRIST

## 2023-05-05 RX ORDER — FLUOROMETHOLONE 1 MG/ML
1 SUSPENSION/ DROPS OPHTHALMIC 3 TIMES DAILY PRN
Qty: 10 ML | Refills: 3 | Status: SHIPPED | OUTPATIENT
Start: 2023-05-05

## 2023-05-08 NOTE — PROGRESS NOTES
HPI    Patient here today for 7 day follow up   C/o itchy eyes  Using FML QID  Started CTL wear again today   Last edited by Lina Orantes, PCT on 5/5/2023  2:26 PM.            Assessment /Plan     For exam results, see Encounter Report.    Corneal neovascularization of both eyes  Continue FML QID x 7 days. Resume CTL wear sparingly. RTC 7 days for CTL refitting.      Red eye associated with contact lens  See above.      Irregular astigmatism of both eyes  S/p RK surgery with toric IOL, recommend scleral lens refitting x7 days due to amount of corneal pannus.        RTC 7-10 days for CTL refitting.

## 2023-05-18 ENCOUNTER — TELEPHONE (OUTPATIENT)
Dept: OPHTHALMOLOGY | Facility: CLINIC | Age: 68
End: 2023-05-18
Payer: MEDICARE

## 2023-05-18 NOTE — TELEPHONE ENCOUNTER
Called and spoke to patient, patient notes signifcant eye pain, redness and discharge noted while on vacation in [place]. Due to hx of CTL wear and photo sent will call it besivance q2h with tobramycin q2h alternating. Will attempt to call to schedule consult with Corneal specialist in [place] for eval.

## 2023-05-20 ENCOUNTER — DOCUMENTATION ONLY (OUTPATIENT)
Dept: OPHTHALMOLOGY | Facility: CLINIC | Age: 68
End: 2023-05-20
Payer: MEDICARE

## 2023-05-20 NOTE — PROGRESS NOTES
67 year old white female presents for follow up of corneal ulcer. Patient is currently taking fortified tobramycin q1h with fortified vancomycin q1h.     SLE  OS   4+ injection with central  corneal ulceration 2.5mm hypopyon noted in Anterior Chamber.            Assessment /Plan     For exam results, see Encounter Report.    There are no diagnoses linked to this encounter.  Continue Fortified Tobramycin q1h with Vancomycin q1h 24/7     RTC 1 day for f/u with DR BARNES. (Dr on call)

## 2023-05-21 ENCOUNTER — LAB VISIT (OUTPATIENT)
Dept: LAB | Facility: HOSPITAL | Age: 68
End: 2023-05-21
Payer: MEDICARE

## 2023-05-21 ENCOUNTER — OFFICE VISIT (OUTPATIENT)
Dept: OPHTHALMOLOGY | Facility: CLINIC | Age: 68
End: 2023-05-21
Payer: MEDICARE

## 2023-05-21 DIAGNOSIS — S05.01XA ABRASION OF RIGHT CORNEA, INITIAL ENCOUNTER: Primary | ICD-10-CM

## 2023-05-21 DIAGNOSIS — H16.001 CORNEAL ULCER OF RIGHT EYE: Primary | ICD-10-CM

## 2023-05-21 DIAGNOSIS — H16.001 CORNEAL ULCER OF RIGHT EYE: ICD-10-CM

## 2023-05-21 PROCEDURE — 99215 PR OFFICE/OUTPT VISIT, EST, LEVL V, 40-54 MIN: ICD-10-PCS | Mod: S$GLB,,, | Performed by: STUDENT IN AN ORGANIZED HEALTH CARE EDUCATION/TRAINING PROGRAM

## 2023-05-21 PROCEDURE — 87070 CULTURE OTHR SPECIMN AEROBIC: CPT | Performed by: STUDENT IN AN ORGANIZED HEALTH CARE EDUCATION/TRAINING PROGRAM

## 2023-05-21 PROCEDURE — 99215 OFFICE O/P EST HI 40 MIN: CPT | Mod: S$GLB,,, | Performed by: STUDENT IN AN ORGANIZED HEALTH CARE EDUCATION/TRAINING PROGRAM

## 2023-05-23 ENCOUNTER — DOCUMENTATION ONLY (OUTPATIENT)
Dept: OPHTHALMOLOGY | Facility: CLINIC | Age: 68
End: 2023-05-23
Payer: MEDICARE

## 2023-05-23 PROCEDURE — 99215 OFFICE O/P EST HI 40 MIN: CPT | Mod: S$GLB,,, | Performed by: STUDENT IN AN ORGANIZED HEALTH CARE EDUCATION/TRAINING PROGRAM

## 2023-05-23 PROCEDURE — 99215 PR OFFICE/OUTPT VISIT, EST, LEVL V, 40-54 MIN: ICD-10-PCS | Mod: S$GLB,,, | Performed by: STUDENT IN AN ORGANIZED HEALTH CARE EDUCATION/TRAINING PROGRAM

## 2023-05-23 NOTE — PROGRESS NOTES
ASSESSMENT & PLAN   Bacterial Keratitis- Scleral contact lens wearer. Reports being in hot tub at time of infection. Was not seen for 5 days after symptom on sent. Rolando by cornea specialist at outside hospital who cultured wound. Gram stain from outside hospital pending finalization. She was started on fortified ABx at that time. Patient was seen by Dr. Hill yesterday and he noted minimal improvement today. Cornea consulted and patient will see  tomorrow 5/22/23.      Continue Fortified ABx OD  Atropine TID OD  Vitamin C 2g daily        45-60 minutes spent on patient history and evaluation and in note     *Patient is allergic to doxycycline

## 2023-05-23 NOTE — PROGRESS NOTES
05/21/2023  8AM    Tammy Mackenzie  1955    Reason for Consult or Chief Complaint: Corneal Ulcer, right eye  Consulting Physician/Service: [facility]  CC: No chief complaint on file.      History of present illness      67 y.o. female patient wearing scleral contact lens. Reports being in hot tub and her eye started bothering her. The patient was on a cruise and was not seen for 5 days.    POH: RK OU    Allergies:   Review of patient's allergies indicates:   Allergen Reactions    Codeine Itching    Doxycycline      esophagitis       PMH:  has a past medical history of Thyroid disorder.    SH:   Social History     Tobacco Use    Smoking status: Former    Smokeless tobacco: Never   Substance Use Topics    Alcohol use: Not on file       FH: family history includes Cataracts in her mother; Macular degeneration in her mother; Stroke in her father; Thyroid disease in her mother.    Scheduled Meds:  Continuous Infusions:  PRN Meds:.    ROS: 10 pt. ROS negative except as noted in HPI    Exam    Workup OD OS   VA HM 20/20   IOP DEFERRED DEFERRED   Pupils irregular No APD   CVF No gross deficits No gross deficits   EOM Intact Intact     SLE OD OS   Ocular [place] WNL WNL   Conjunctiva/Sclera 4+ INJECTION, mucopurulent discharge W&Q   Cornea 6mm Epi defect with underlying opacification, 8 cut RK, disffuse PEE, mild neovascularization at 6 o'clock Diffuse PEE   Iris irregular Normal   AC 4+ cell/flare Deep   Lens PCIOL PCIOL       DFE Deferred    Results      Images: Reviewed  No images are attached to the encounter.    Assessment & Plan   Bacterial Keratitis- Scleral contact lens wearer. Reports being in hot tub at time of infection. Was not seen for 5 days after symptom on sent. Rolando by cornea specialist at outside hospital who cultured wound. Gram stain from outside hospital pending finalization. She was started on fortified ABx at that time. Patient was seen by Dr. Hill yesterday and he noted minimal improvement  today. Cornea consulted and patient will see  tomorrow 5/22/23.     Continue Fortified ABx OD  Atropine TID OD  Vitamin C 2g daily      45-60 minutes spent on patient history and evaluation and in note     *Patient is allergic to doxycycline

## 2023-05-25 LAB — BACTERIA SPEC AEROBE CULT: NO GROWTH

## 2023-05-31 ENCOUNTER — PATIENT MESSAGE (OUTPATIENT)
Dept: RESEARCH | Facility: HOSPITAL | Age: 68
End: 2023-05-31
Payer: MEDICARE

## 2023-07-26 ENCOUNTER — OFFICE VISIT (OUTPATIENT)
Dept: OPHTHALMOLOGY | Facility: CLINIC | Age: 68
End: 2023-07-26
Payer: MEDICARE

## 2023-07-26 DIAGNOSIS — H52.212 IRREGULAR ASTIGMATISM OF LEFT EYE: Primary | ICD-10-CM

## 2023-07-26 DIAGNOSIS — H40.013 OPEN ANGLE WITH BORDERLINE FINDINGS AND LOW GLAUCOMA RISK IN BOTH EYES: ICD-10-CM

## 2023-07-26 DIAGNOSIS — H16.403 CORNEAL NEOVASCULARIZATION OF BOTH EYES: ICD-10-CM

## 2023-07-26 DIAGNOSIS — H17.11 CENTRAL CORNEAL OPACITY, RIGHT: ICD-10-CM

## 2023-07-26 PROCEDURE — 99213 PR OFFICE/OUTPT VISIT, EST, LEVL III, 20-29 MIN: ICD-10-PCS | Mod: S$GLB,,, | Performed by: OPTOMETRIST

## 2023-07-26 PROCEDURE — 99999 PR PBB SHADOW E&M-EST. PATIENT-LVL III: CPT | Mod: PBBFAC,,, | Performed by: OPTOMETRIST

## 2023-07-26 PROCEDURE — 1159F MED LIST DOCD IN RCRD: CPT | Mod: CPTII,S$GLB,, | Performed by: OPTOMETRIST

## 2023-07-26 PROCEDURE — 99999 PR PBB SHADOW E&M-EST. PATIENT-LVL III: ICD-10-PCS | Mod: PBBFAC,,, | Performed by: OPTOMETRIST

## 2023-07-26 PROCEDURE — 99213 OFFICE O/P EST LOW 20 MIN: CPT | Mod: S$GLB,,, | Performed by: OPTOMETRIST

## 2023-07-26 PROCEDURE — 1159F PR MEDICATION LIST DOCUMENTED IN MEDICAL RECORD: ICD-10-PCS | Mod: CPTII,S$GLB,, | Performed by: OPTOMETRIST

## 2023-07-26 RX ORDER — MOXIFLOXACIN 5 MG/ML
1 SOLUTION/ DROPS OPHTHALMIC 4 TIMES DAILY
COMMUNITY
Start: 2023-06-26 | End: 2023-07-26 | Stop reason: SDUPTHER

## 2023-07-26 RX ORDER — MOXIFLOXACIN 5 MG/ML
SOLUTION/ DROPS OPHTHALMIC
Qty: 3 ML | Refills: 2 | Status: SHIPPED | OUTPATIENT
Start: 2023-07-26 | End: 2023-09-06 | Stop reason: SDUPTHER

## 2023-07-26 NOTE — LETTER
[facility]  [address] [place] [address]  Phone: [phone]  Fax: [phone]   July 26, 2023    Florin Belle MD  [address] [facility] [address]    Patient: Tammy Mackenzie   MR Number: [id number]   YOB: 1955   Date of Visit: 7/26/2023       Dear Dr. Belle      Thank you for all your help and expertise on Tammy Mackenzie. Here is my assessment and plan of care:    Assessment/Plan    For exam results, see Encounter Report.    Irregular astigmatism of left eye  -     moxifloxacin (VIGAMOX) 0.5 % ophthalmic solution; Instill 1 drop into bowl of contact lens prior to inserting into the eye.  Dispense: 3 mL; Refill: 2  OS refracts to 20/25 with scleral lens potential performed in office today.   Long discussion on risks/benefits of scleral lens discussed with patient.   Patient elect to proceed with scleral lens fitting OS.   Prophylactic Moxifloxacin Rxed for use with CTL due to monocular nature.   Strict CTL hygiene reviewed.     Central corneal opacity, right  Secondary to previous K Ulcer (Pseudomonas). Infection has since resolved. Sees Dr eBlle. Continue drops as directed per Dr Belle. Defer CTL for OD at this time.     Open angle with borderline findings and low glaucoma risk in both eyes  Sees Dr Jones. Continue per Dr Jones.     Corneal neovascularization of both eyes  Scleral lens refitted today change material to XO2 at final lens. Observe pannus closely.   Limit wear time to 6 hours daily.       RTC with Dr Jones and Dr Belle as scheduled for examinations. Liz in 2 weeks for contact lens follow-up.       Sincerely,        Naveen Hill, OD       CC    No Recipients

## 2023-07-26 NOTE — PROGRESS NOTES
HPI    Patient here today for CTL fit    Perpendicular IOP's    1.PCIOL OU ([facility])   2.YAG OU ([facility])   3.8 cut RK Sx w/ multiple AK Sutures OU ([facility])   Patient saw Dr. Belle not a candidate for corneal transplant  4.Irregular Astigmatism   5. Blepharoplasty 9/15   6. COAG suspect +FHx (mother + son)      Last edited by Lina Orantes, PCT on 7/26/2023 11:04 AM.            Assessment /Plan     For exam results, see Encounter Report.    Irregular astigmatism of left eye  -     moxifloxacin (VIGAMOX) 0.5 % ophthalmic solution; Instill 1 drop into bowl of contact lens prior to inserting into the eye.  Dispense: 3 mL; Refill: 2  OS refracts to 20/25 with scleral lens potential performed in office today.   Long discussion on risks/benefits of scleral lens discussed with patient.   Patient elect to proceed with scleral lens fitting OS.   Prophylactic Moxifloxacin Rxed for use with CTL due to monocular nature.   Strict CTL hygiene reviewed.     Central corneal opacity, right  Secondary to previous K Ulcer (Pseudomonas). Infection has since resolved. Sees Dr Belle. Continue per drops as directed per Dr Belle. Defer CTL for OD at this time.     Open angle with borderline findings and low glaucoma risk in both eyes  Sees Dr Jones. Continue per Dr Jones.     Corneal neovascularization of both eyes  Scleral lens refitted today change material to XO2 at final lens. Observe pannus closely.   Limit wear time to 6 hours daily.       RTC with Dr Jones and Dr Belle as scheduled for examinations. RTC w/ me in 2 weeks for contact lens follow-up.

## 2023-08-04 ENCOUNTER — OFFICE VISIT (OUTPATIENT)
Dept: OPHTHALMOLOGY | Facility: CLINIC | Age: 68
End: 2023-08-04
Payer: MEDICARE

## 2023-08-04 DIAGNOSIS — H16.403 CORNEAL NEOVASCULARIZATION OF BOTH EYES: ICD-10-CM

## 2023-08-04 DIAGNOSIS — H52.212 IRREGULAR ASTIGMATISM OF LEFT EYE: Primary | ICD-10-CM

## 2023-08-04 DIAGNOSIS — H40.013 OPEN ANGLE WITH BORDERLINE FINDINGS AND LOW GLAUCOMA RISK IN BOTH EYES: ICD-10-CM

## 2023-08-04 DIAGNOSIS — H17.11 CENTRAL CORNEAL OPACITY, RIGHT: ICD-10-CM

## 2023-08-04 PROCEDURE — 99999 PR PBB SHADOW E&M-EST. PATIENT-LVL I: CPT | Mod: PBBFAC,,, | Performed by: OPTOMETRIST

## 2023-08-04 PROCEDURE — 92499 UNLISTED OPH SVC/PROCEDURE: CPT | Mod: ,,, | Performed by: OPTOMETRIST

## 2023-08-04 PROCEDURE — 99499 UNLISTED E&M SERVICE: CPT | Mod: S$GLB,,, | Performed by: OPTOMETRIST

## 2023-08-04 PROCEDURE — 99999 PR PBB SHADOW E&M-EST. PATIENT-LVL I: ICD-10-PCS | Mod: PBBFAC,,, | Performed by: OPTOMETRIST

## 2023-08-04 PROCEDURE — 99499 NO LOS: ICD-10-PCS | Mod: S$GLB,,, | Performed by: OPTOMETRIST

## 2023-08-04 PROCEDURE — 92499 PR CONTACT LENS F/U LEV 1: ICD-10-PCS | Mod: ,,, | Performed by: OPTOMETRIST

## 2023-08-04 NOTE — Clinical Note
Luís Mitchell,   This patient requires a new lens for OS, I believe you have worked with her in the past and got her insurance to cover it. Please let me know when it is OK to order the lens. I would like to speak with a consultant about this one   Thanks, DT

## 2023-08-04 NOTE — PROGRESS NOTES
HPI     Contact Lens Fit            Comments: Patient reports for CTL fitting. Patient states VA is stable.   Patient denies pain and discomfort.           Comments    Daughter of Rosalind Brady (Former patient)     Perpendicular IOP's    1.PCIOL OU ([facility])   2.YAG OU ([facility])   3.8 cut RK Sx w/ multiple AK Sutures OU ([facility])   Patient saw Dr. Belle not a candidate for corneal transplant  4.Irregular Astigmatism   5. Blepharoplasty 9/15   6. COAG suspect +FHx (mother + son)      Systane PRN OU            Last edited by Naveen Hill, OD on 8/4/2023 11:40 AM.            Assessment /Plan     For exam results, see Encounter Report.    Irregular astigmatism of left eye  -     moxifloxacin (VIGAMOX) 0.5 % ophthalmic solution; Instill 1 drop into bowl of contact lens prior to inserting into the eye.  Dispense: 3 mL; Refill: 2  OS refracts to 20/25 with scleral lens potential performed in office today.   Long discussion on risks/benefits of scleral lens discussed with patient.   Patient elect to proceed with scleral lens fitting OS.   Prophylactic Moxifloxacin Rxed for use with CTL due to monocular nature.   Strict CTL hygiene reviewed.      Contact Lens Final Rx       Final Contact Lens Rx         Brand Base Curve Diameter Sphere Cylinder Axis Addl. Specs    Right No lens          Left Zenlens Z-23 Toric 8.50 17.0 -2.50 -2.25 015 SAG [address] [place] XO 2      Expiration Date: 8/4/2024    Replacement: Yearly    Solutions: [place]    Wearing Schedule: Daily Wear                  Contact lens trials fitted in office today. Contact lens hygiene reviewed. Patient able to insert the lenses themselves with minimal difficulty. Patient ok to finalize Contact lens after 1 week of wear. RTC if still having difficulty with CTL trial after 1 week.     Central corneal opacity, right  Secondary to previous K Ulcer (Pseudomonas). Infection has since resolved. Sees Dr Belle. Continue per drops as directed per Dr Belle. Defer CTL for  OD at this time.      Open angle with borderline findings and low glaucoma risk in both eyes  Sees Dr Jones. Continue per Dr Jones.      Corneal neovascularization of both eyes  Scleral lens refitted today change material to XO2 at final lens. Observe pannus closely.   Limit wear time to 6 hours daily.         RTC with Dr Jones and Dr Belle as scheduled for examinations. RTC w/ me when CTL arrives for dispense. Appointment needed.

## 2023-08-04 NOTE — LETTER
August 4, 2023    Tammy Mackenzie  [address] [place] [address]             [facility]  [address] [place] [address]  Phone: [phone]  Fax: [phone] Re: Tammy Mackenzie  MRN: [id number]  YOB: [birth year]    To Whom It May Concern:    Tammy Mackenzie has a diagnosis of Irregular astigmatism of left eye [H52.212].    Contact lenses produce vision beyond which is obtainable with glasses alone.    These contact lenses are medically necessary to improve this patient's daily living functionality and ocular health.  Since these Contact Lens, GP, Scleral, Per Lens (CPT Code  ) are not cosmetic, they should be a covered benefit under this patient's medical health plan.    Please let me know if you have any questions.    Sincerely,    Naveen Hill OD

## 2023-09-06 ENCOUNTER — OFFICE VISIT (OUTPATIENT)
Dept: OPHTHALMOLOGY | Facility: CLINIC | Age: 68
End: 2023-09-06
Payer: MEDICARE

## 2023-09-06 DIAGNOSIS — H16.403 CORNEAL NEOVASCULARIZATION OF BOTH EYES: ICD-10-CM

## 2023-09-06 DIAGNOSIS — H40.013 OPEN ANGLE WITH BORDERLINE FINDINGS AND LOW GLAUCOMA RISK IN BOTH EYES: ICD-10-CM

## 2023-09-06 DIAGNOSIS — H52.212 IRREGULAR ASTIGMATISM OF LEFT EYE: Primary | ICD-10-CM

## 2023-09-06 DIAGNOSIS — H17.11 CENTRAL CORNEAL OPACITY, RIGHT: ICD-10-CM

## 2023-09-06 DIAGNOSIS — Z96.1 PSEUDOPHAKIA OF BOTH EYES: ICD-10-CM

## 2023-09-06 PROCEDURE — 99999 PR PBB SHADOW E&M-EST. PATIENT-LVL III: CPT | Mod: PBBFAC,,, | Performed by: OPTOMETRIST

## 2023-09-06 PROCEDURE — 99999 PR PBB SHADOW E&M-EST. PATIENT-LVL III: ICD-10-PCS | Mod: PBBFAC,,, | Performed by: OPTOMETRIST

## 2023-09-06 PROCEDURE — 92499 UNLISTED OPH SVC/PROCEDURE: CPT | Mod: ,,, | Performed by: OPTOMETRIST

## 2023-09-06 PROCEDURE — 92499 PR CONTACT LENS F/U LEV 1: ICD-10-PCS | Mod: ,,, | Performed by: OPTOMETRIST

## 2023-09-06 PROCEDURE — 99499 UNLISTED E&M SERVICE: CPT | Mod: S$GLB,,, | Performed by: OPTOMETRIST

## 2023-09-06 PROCEDURE — 99499 NO LOS: ICD-10-PCS | Mod: S$GLB,,, | Performed by: OPTOMETRIST

## 2023-09-06 RX ORDER — MOXIFLOXACIN 5 MG/ML
SOLUTION/ DROPS OPHTHALMIC
Qty: 3 ML | Refills: 2 | Status: SHIPPED | OUTPATIENT
Start: 2023-09-06 | End: 2023-10-27 | Stop reason: SDUPTHER

## 2023-09-06 NOTE — PROGRESS NOTES
HPI     Contact Lens Fit            Comments: Patient reports for CTL f/u. Patient denies pain and   discomfort. Patient states VA is stable. No further complaints at this   time.           Comments      1.PCIOL OU ([facility])   2.YAG OU ([facility])   3.8 cut RK Sx w/ multiple AK Sutures OU ([facility])   Patient saw Dr. Belle not a candidate for corneal transplant  4.Irregular Astigmatism   5. Blepharoplasty 9/15   6. COAG suspect +FHx (mother + son)      Systane PRN OU            Last edited by Luciana Hastings MA on 9/6/2023  8:09 AM.            Assessment /Plan     For exam results, see Encounter Report.    Irregular astigmatism of left eye  2/2 Previous RK surgery   Toric scleral lens dispensed today. BCVA 20/25 OS in office.   Prophylactic moxifloxacin Rxed for use with contact lens due to monocular nature.   Strict CTL hygiene reviewed.     Central corneal opacity, right  2/2 Pseudomonas, K Ulcer. (Resolved with Dr Belle.)  Finished all drops.   Scleral lens potential 20/150 OD in office today. [facility]  Vision appears limited by K opacity.  Continue per Dr Belle.     Corneal neovascularization of both eyes  XO2 material dispensed today.   Limit wear time 6 hours for now.     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     Pseudophakia of both eyes  S/p CE/IOL at [place]. Toric scleral lens fitted today.     RTC with Dr Jones and Dr Belle as scheduled. RTC with me in 2 weeks for contact lens follow up.

## 2023-09-06 NOTE — LETTER
[facility]  [address] [place] [address]  Phone: [phone]  Fax: [phone]   September 6, 2023    Florin Belle MD  [address] [facility] [address]    Patient: Tammy Mackenzie   MR Number: [id number]   YOB: 1955   Date of Visit: 9/6/2023       Dear Dr. Belle    Thank you for your help with Mrs Tammy Mackenzie. Here is my assessment and plan of care:    Assessment/Plan    For exam results, see Encounter Report.    Irregular astigmatism of left eye  2/2 Previous RK surgery   Toric scleral lens dispensed today. BCVA 20/25 OS in office.   Prophylactic moxifloxacin Rxed for use with contact lens due to monocular nature.   Strict CTL hygiene reviewed.     Central corneal opacity, right  2/2 Pseudomonas, K Ulcer. (Resolved with Dr Belle.)  Finished all drops.   Scleral lens potential 20/150 OD in office today. [facility]  Continue per Dr Belle.     Corneal neovascularization of both eyes  XO2 material dispensed today.   Limit wear time 6 hours for now.     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     Pseudophakia of both eyes  S/p CE/IOL at [place]. Toric scleral lens fitted today OS.     RTC with Dr Jones and Dr Belle as scheduled. RTC with me in 2 weeks for contact lens follow up.           Below you will find my full exam findings. If you have questions, please do not hesitate to call me. I look forward to following Ms. Tammy Mackenzie along with you.    Sincerely,        Naveen Hill K, OD       CC    No Recipients             Base Eye Exam       Visual Acuity (Snellen - Linear)         Right Left    Dist cc 20/150 20/25      Correction: Contacts              Pupils         Pupils    Right PERRL    Left PERRL              Neuro/Psych       Oriented x3: Yes    Mood/Affect: Normal                  Slit Lamp and Fundus Exam       External Exam         Right Left    External Normal  Normal              Slit Lamp Exam         Right Left    Lids/Lashes Normal Normal    Conjunctiva/Sclera White and quiet White and quiet    Cornea 8 cut RK with AK's x 4 macropannus noted inferiorly with central haze, Central corneal opacity with 3 + corneal haze noted, Pannus @ 6 o'clock 8 cut RK with AK's x 2, raised with PEK above inferior AK incision with small abrasion pannus 4mm inferiorly    Anterior Chamber Deep and quiet Deep and quiet    Iris mild Neovascularization at 6 o'clock Round and reactive    Lens PC IOL, Toric IOL, Clear capsulotomy PC IOL, Toric IOL, Clear capsulotomy                  Contact Lens Exam       Current Contact Lens Rx         Brand Base Curve Diameter Sphere Cylinder Axis Addl. Specs Over-Sphere Over-Cyl    Right No Lens            Left Zenlens Z-23 Toric 8.50 17.0 -2.50 -2.25 015 SAG [address] [place] XO 2          Over-Axis Over-Dist VA    Right      Left                Current Contact Lens Rx #2         Brand Base Curve Diameter Sphere Cylinder Axis Addl. Specs Over-Sphere Over-Cyl    Right Zenlens Z-22 9.1 17.0 -2.00   SAG 5.100 -0.50 +1.50    Left Zenlens Z-23 Toric 8.50 17.0 -2.50 -2.25 015 SAG [address] [place] XO 2 Alpha         Over-[place] Over-Dist VA    Right 064 20/150    Left  20/25              Current Contact Lens Rx Comments    OS- Central vault 400, good landing    OD- Central clearance 400, good landing

## 2023-09-22 ENCOUNTER — OFFICE VISIT (OUTPATIENT)
Dept: OPHTHALMOLOGY | Facility: CLINIC | Age: 68
End: 2023-09-22
Payer: MEDICARE

## 2023-09-22 DIAGNOSIS — H16.403 CORNEAL NEOVASCULARIZATION OF BOTH EYES: ICD-10-CM

## 2023-09-22 DIAGNOSIS — H52.212 IRREGULAR ASTIGMATISM OF LEFT EYE: Primary | ICD-10-CM

## 2023-09-22 DIAGNOSIS — H17.11 CENTRAL CORNEAL OPACITY, RIGHT: ICD-10-CM

## 2023-09-22 PROCEDURE — 99999 PR PBB SHADOW E&M-EST. PATIENT-LVL III: CPT | Mod: PBBFAC,,, | Performed by: OPTOMETRIST

## 2023-09-22 PROCEDURE — 99499 UNLISTED E&M SERVICE: CPT | Mod: S$GLB,,, | Performed by: OPTOMETRIST

## 2023-09-22 PROCEDURE — 99499 NO LOS: ICD-10-PCS | Mod: S$GLB,,, | Performed by: OPTOMETRIST

## 2023-09-22 PROCEDURE — 99999 PR PBB SHADOW E&M-EST. PATIENT-LVL III: ICD-10-PCS | Mod: PBBFAC,,, | Performed by: OPTOMETRIST

## 2023-09-22 NOTE — PROGRESS NOTES
HPI     Follow-up            Comments: Patient reports for CTL f/u. Patient denies pain and   discomfort. Patient states VA is stable. No further complaints at this   time.           Comments    Daughter of Rosalind Brady (Former patient)     Perpendicular IOP's    1.PCIOL OU (Su)   2.YAG OU (Su)   3.8 cut RK Sx w/ multiple AK Sutures OU ([facility])   Patient saw Dr. Belle not a candidate for corneal transplant  4.Irregular Astigmatism   5. Blepharoplasty 9/15   6. COAG suspect +FHx (mother + son)      Systane PRN OU            Last edited by Luciana Hastings MA on 9/22/2023  1:36 PM.            Assessment /Plan     For exam results, see Encounter Report.    Irregular astigmatism of left eye  2/2 Previous RK surgery   Doing well with current toric scleral lens; BCVA 20/25 OS in office.   Prophylactic moxifloxacin Rxed for use with contact lens due to monocular nature.   Strict CTL hygiene reviewed.      Central corneal opacity, right  2/2 Pseudomonas, K Ulcer. (Resolved with Dr Belle.)  Finished all drops.   Scleral lens potential 20/150 OD [facility]  Vision appears limited by K opacity.  Continue per Dr Belle.      Corneal neovascularization of both eyes  XO2 material dispensed.   Limit wear time 8 hours     Open angle with borderline findings and low glaucoma risk in both eyes  Sees Dr Jones, continue per Dr Jones.      Pseudophakia of both eyes  S/p CE/IOL at [place]. Toric scleral lens fitted today.     RTC with Dr Jones and Dr Belle as scheduled. RTC with me in 4 weeks for contact lens follow up.

## 2023-10-27 ENCOUNTER — OFFICE VISIT (OUTPATIENT)
Dept: OPHTHALMOLOGY | Facility: CLINIC | Age: 68
End: 2023-10-27
Payer: MEDICARE

## 2023-10-27 DIAGNOSIS — H17.11 CENTRAL CORNEAL OPACITY, RIGHT: ICD-10-CM

## 2023-10-27 DIAGNOSIS — H52.212 IRREGULAR ASTIGMATISM OF LEFT EYE: Primary | ICD-10-CM

## 2023-10-27 PROCEDURE — 99499 UNLISTED E&M SERVICE: CPT | Mod: S$GLB,,, | Performed by: OPTOMETRIST

## 2023-10-27 PROCEDURE — 92499 PR CONTACT LENS F/U LEV 1: ICD-10-PCS | Mod: ,,, | Performed by: OPTOMETRIST

## 2023-10-27 PROCEDURE — 99999 PR PBB SHADOW E&M-EST. PATIENT-LVL III: CPT | Mod: PBBFAC,,, | Performed by: OPTOMETRIST

## 2023-10-27 PROCEDURE — 99499 NO LOS: ICD-10-PCS | Mod: S$GLB,,, | Performed by: OPTOMETRIST

## 2023-10-27 PROCEDURE — 99999 PR PBB SHADOW E&M-EST. PATIENT-LVL III: ICD-10-PCS | Mod: PBBFAC,,, | Performed by: OPTOMETRIST

## 2023-10-27 PROCEDURE — 92499 UNLISTED OPH SVC/PROCEDURE: CPT | Mod: ,,, | Performed by: OPTOMETRIST

## 2023-10-27 RX ORDER — MOXIFLOXACIN 5 MG/ML
SOLUTION/ DROPS OPHTHALMIC
Qty: 3 ML | Refills: 2 | Status: SHIPPED | OUTPATIENT
Start: 2023-10-27 | End: 2024-04-01

## 2023-10-27 NOTE — PROGRESS NOTES
HPI     Contact Lens Follow Up            Comments: 4 weeks rtc OS contact lens           Comments    Last visit with MONIQUE on 09/22/2023    Patient states left eye vision is doing well, itchy eyes today, but no   ocular pain.          Last edited by Naveen Hill, OD on 10/27/2023  2:33 PM.            Assessment /Plan     For exam results, see Encounter Report.    Irregular astigmatism of left eye  2/2 Previous RK surgery   Doing well with current toric scleral lens; BCVA 20/25 OS in office.   Prophylactic moxifloxacin Rxed for use with contact lens due to monocular nature.   Clear care contact lens solution discussed with jorge.      Central corneal opacity, right  2/2 Pseudomonas, K Ulcer. (Resolved with Dr Belle.)  Finished all drops.   Scleral lens potential 20/150 OD [facility]  Vision appears limited by K opacity.  Continue per Dr Belle.      Corneal neovascularization of both eyes  XO2 material dispensed.   Limit wear time 8 hours     Open angle with borderline findings and low glaucoma risk in both eyes  Sees Dr Jones, continue per Dr Jones.      Pseudophakia of both eyes  S/p CE/IOL at [place]. Toric scleral lens fitted today.       RTC 6 months for contact lens follow up, RTC with Dr Belle and Dr Jones as scheduled.

## 2024-03-31 DIAGNOSIS — H52.212 IRREGULAR ASTIGMATISM OF LEFT EYE: ICD-10-CM

## 2024-04-01 RX ORDER — MOXIFLOXACIN 5 MG/ML
SOLUTION/ DROPS OPHTHALMIC
Qty: 3 ML | Refills: 2 | Status: SHIPPED | OUTPATIENT
Start: 2024-04-01

## 2024-07-02 DIAGNOSIS — H52.212 IRREGULAR ASTIGMATISM OF LEFT EYE: ICD-10-CM

## 2024-07-02 RX ORDER — MOXIFLOXACIN 5 MG/ML
SOLUTION/ DROPS OPHTHALMIC
Qty: 3 ML | Refills: 2 | Status: SHIPPED | OUTPATIENT
Start: 2024-07-02

## 2024-07-31 ENCOUNTER — PATIENT MESSAGE (OUTPATIENT)
Dept: RESEARCH | Facility: HOSPITAL | Age: 69
End: 2024-07-31
Payer: MEDICARE

## 2024-10-25 ENCOUNTER — OFFICE VISIT (OUTPATIENT)
Dept: OPHTHALMOLOGY | Facility: CLINIC | Age: 69
End: 2024-10-25
Payer: MEDICARE

## 2024-10-25 DIAGNOSIS — H17.11 CENTRAL CORNEAL OPACITY, RIGHT: ICD-10-CM

## 2024-10-25 DIAGNOSIS — H52.212 IRREGULAR ASTIGMATISM OF LEFT EYE: Primary | ICD-10-CM

## 2024-10-25 DIAGNOSIS — Z94.7 HISTORY OF CORNEAL TRANSPLANT: ICD-10-CM

## 2024-10-25 DIAGNOSIS — H16.403 CORNEAL NEOVASCULARIZATION OF BOTH EYES: ICD-10-CM

## 2024-10-25 DIAGNOSIS — Z96.1 PSEUDOPHAKIA OF BOTH EYES: ICD-10-CM

## 2024-10-25 DIAGNOSIS — H40.013 OPEN ANGLE WITH BORDERLINE FINDINGS AND LOW GLAUCOMA RISK IN BOTH EYES: ICD-10-CM

## 2024-10-25 PROCEDURE — 99213 OFFICE O/P EST LOW 20 MIN: CPT | Mod: ,,, | Performed by: OPTOMETRIST

## 2024-10-25 PROCEDURE — 92310 CONTACT LENS FITTING OU: CPT | Mod: CSM,,, | Performed by: OPTOMETRIST

## 2024-10-25 PROCEDURE — 99999 PR PBB SHADOW E&M-EST. PATIENT-LVL III: CPT | Mod: PBBFAC,,, | Performed by: OPTOMETRIST

## 2024-10-25 RX ORDER — MOXIFLOXACIN 5 MG/ML
SOLUTION/ DROPS OPHTHALMIC
Qty: 3 ML | Refills: 2 | Status: SHIPPED | OUTPATIENT
Start: 2024-10-25

## 2024-11-13 ENCOUNTER — OFFICE VISIT (OUTPATIENT)
Dept: OPHTHALMOLOGY | Facility: CLINIC | Age: 69
End: 2024-11-13
Payer: MEDICARE

## 2024-11-13 DIAGNOSIS — H17.11 CENTRAL CORNEAL OPACITY, RIGHT: ICD-10-CM

## 2024-11-13 DIAGNOSIS — Z94.7 HISTORY OF CORNEAL TRANSPLANT: ICD-10-CM

## 2024-11-13 DIAGNOSIS — H40.013 OPEN ANGLE WITH BORDERLINE FINDINGS AND LOW GLAUCOMA RISK IN BOTH EYES: ICD-10-CM

## 2024-11-13 DIAGNOSIS — H52.212 IRREGULAR ASTIGMATISM OF LEFT EYE: Primary | ICD-10-CM

## 2024-11-13 DIAGNOSIS — H16.403 CORNEAL NEOVASCULARIZATION OF BOTH EYES: ICD-10-CM

## 2024-11-13 PROCEDURE — 99499 UNLISTED E&M SERVICE: CPT | Mod: ,,, | Performed by: OPTOMETRIST

## 2024-11-13 PROCEDURE — 92499 UNLISTED OPH SVC/PROCEDURE: CPT | Mod: CSM,,, | Performed by: OPTOMETRIST

## 2024-11-14 NOTE — PROGRESS NOTES
HPI     Contact Lens Follow Up            Comments: Pt reports for ctl f/u. No pain or irritation. VA stable.           Comments    Daughter of Rosalind Brady (Former patient)     Perpendicular IOP's    1.PCIOL OU ([facility])   2.YAG OU ([facility])   3.8 cut RK Sx w/ multiple AK Sutures OU ([facility])   Patient saw Dr. Belle not a candidate for corneal transplant  4.Irregular Astigmatism   5. Blepharoplasty 9/15   6. COAG suspect +FHx (mother + son)      Systane PRN OU             Last edited by Beverly Peterson on 11/13/2024  3:35 PM.            Assessment /Plan     For exam results, see Encounter Report.    Irregular astigmatism of left eye  Contact Lens Final Rx       Final Contact Lens Rx         Brand Base Curve Diameter Sphere Cylinder Axis Addl. Specs    Right No lens          Left Zenlens Z-23 Toric 8.50 17.0 -3.00 -2.25 015 SAG 5350 [place] XO 2                  CTL dispensed with parameters above today.   Continue prophylactic moxi with CTL use due to monocular nature.   CTL hygiene reviewed.     History of corneal transplant right eye   Central corneal opacity, right  Continue per Dr Belle.     Corneal neovascularization of both eyes  Limit CTL to waking hours. Continue XO2 material for O2 permeability.     Open angle with borderline findings and low glaucoma risk in both eyes  Sees Dr CPG, continue per Dr EDWARDS.     RTC 10 days for CTL f/u, sooner if any changes to vision or worsening symptoms.

## 2024-11-17 ENCOUNTER — PATIENT MESSAGE (OUTPATIENT)
Dept: OPTOMETRY | Facility: CLINIC | Age: 69
End: 2024-11-17
Payer: MEDICARE

## 2024-12-13 ENCOUNTER — OFFICE VISIT (OUTPATIENT)
Dept: OPHTHALMOLOGY | Facility: CLINIC | Age: 69
End: 2024-12-13
Payer: MEDICARE

## 2024-12-13 DIAGNOSIS — H16.403 CORNEAL NEOVASCULARIZATION OF BOTH EYES: ICD-10-CM

## 2024-12-13 DIAGNOSIS — H17.11 CENTRAL CORNEAL OPACITY, RIGHT: ICD-10-CM

## 2024-12-13 DIAGNOSIS — H40.013 OPEN ANGLE WITH BORDERLINE FINDINGS AND LOW GLAUCOMA RISK IN BOTH EYES: ICD-10-CM

## 2024-12-13 DIAGNOSIS — Z94.7 HISTORY OF CORNEAL TRANSPLANT: ICD-10-CM

## 2024-12-13 DIAGNOSIS — H52.212 IRREGULAR ASTIGMATISM OF LEFT EYE: Primary | ICD-10-CM

## 2024-12-13 PROCEDURE — 99999 PR PBB SHADOW E&M-EST. PATIENT-LVL III: CPT | Mod: PBBFAC,,, | Performed by: OPTOMETRIST

## 2024-12-13 RX ORDER — MOXIFLOXACIN 5 MG/ML
SOLUTION/ DROPS OPHTHALMIC
Qty: 3 ML | Refills: 2 | Status: SHIPPED | OUTPATIENT
Start: 2024-12-13

## 2024-12-13 NOTE — LETTER
[facility]  Ophthalmology  [address] [place] [address]  Phone: [phone]  Fax: [phone]   December 13, 2024    Florin Belle MD  [address] [address]  [place] [address]    Patient: Tammy Mackenzie   MR Number: [id number]   YOB: 1955   Date of Visit: 12/13/2024       Dear Dr. Belle :    Thank you for referring Tammy Mackenzie to me for evaluation. Here is my assessment and plan of care:    Assessment/Plan    For exam results, see Encounter Report.    1. Irregular astigmatism of left eye  -     moxifloxacin (VIGAMOX) 0.5 % ophthalmic solution; INTO BOWL OF CONTACT LENS PRIOR TO INSERTING INTO THE LEFT EYE  Dispense: 3 mL; Refill: 2  S/p RK OS   Scleral lens finalized today OS BCVA 20/25   Risks/benefits of lens discussed with patient. Continue prophylactic moxi due to monocular nature.  CTL hygiene reviewed.     2. History of corneal transplant right eye   S/p PKP OD with Dr Belle. Continue per Dr Belle.  Continue PF BID      3. Central corneal opacity, right  Observe.     4. Corneal neovascularization of both eyes  Limit CTL wear time to waking hours. [place] XO2 material fitted today for better permeability.     5. Open angle with borderline findings and low glaucoma risk in both eyes  Sees Dr EDWARDS,continue per Dr Jones.      RTC with Dr Belle as scheduled.               Below you can find relevant pieces of the exam. If you have questions, please do not hesitate to call me. I look forward to following Tammy Mackenzie along with you.    Sincerely,        Naveen Hill, OD       CC  No Recipients             Main Ophthalmology Exam       External Exam         Right Left    External Normal Normal              Slit Lamp Exam         Right Left    Lids/Lashes Normal Debris    Conjunctiva/Sclera White and quiet Conjunctivochalasis    Cornea 8 cut RK with AK's x 4 macropannus noted inferiorly with central haze, Central corneal opacity with 2 + corneal haze noted,  "Pannus @ 6 o'clock diffuse central PEE 8 cut RK with AK's x 2, raised with PEK above inferior AK incision with small abrasion pannus 4-mm inferiorly. Diffuse pannus 360.    Anterior Chamber Deep and quiet Deep and quiet    Iris Round and reactive Round and reactive    Lens PC IOL, Toric IOL, Clear capsulotomy PC IOL, Toric IOL, Clear capsulotomy                   <div id="[email or web address]"></div>     "

## 2024-12-13 NOTE — PROGRESS NOTES
HPI     Contact Lens Follow Up            Comments: Patient here today for CTL f/u   Patient states OD is burning and mildly irritated  Vision stable            Comments    1.PCIOL OU ([facility])   2.YAG OU ([facility])   3.8 cut RK Sx w/ multiple AK Sutures OU ([facility])   Patient saw Dr. Belle not a candidate for corneal transplant  4.Irregular Astigmatism   5. Blepharoplasty 9/15   6. COAG suspect +FHx (mother + son)            Last edited by Lina Orantes, PCT on 12/13/2024  9:50 AM.            Assessment /Plan     For exam results, see Encounter Report.    1. Irregular astigmatism of left eye  -     moxifloxacin (VIGAMOX) 0.5 % ophthalmic solution; INTO BOWL OF CONTACT LENS PRIOR TO INSERTING INTO THE LEFT EYE  Dispense: 3 mL; Refill: 2  S/p RK OS   Scleral lens finalized today OS BCVA 20/25   Risks/benefits of lens discussed with patient. Continue prophylactic moxi due to monocular nature.  CTL hygiene reviewed.     2. History of corneal transplant right eye   S/p PKP OD with Dr Belle. Continue per Dr Belle.  Continue PF BID      3. Central corneal opacity, right  Observe.     4. Corneal neovascularization of both eyes  Limit CTL wear time to waking hours. [place] XO2 material fitted today for better permeability.     5. Open angle with borderline findings and low glaucoma risk in both eyes  Sees Dr EDWARDS,continue per Dr Jones.      RTC with Dr Belle as scheduled.

## 2025-06-13 ENCOUNTER — OFFICE VISIT (OUTPATIENT)
Dept: OPHTHALMOLOGY | Facility: CLINIC | Age: 70
End: 2025-06-13
Payer: MEDICARE

## 2025-06-13 DIAGNOSIS — H16.403 CORNEAL NEOVASCULARIZATION OF BOTH EYES: ICD-10-CM

## 2025-06-13 DIAGNOSIS — H40.013 OPEN ANGLE WITH BORDERLINE FINDINGS AND LOW GLAUCOMA RISK IN BOTH EYES: ICD-10-CM

## 2025-06-13 DIAGNOSIS — Z94.7 HISTORY OF CORNEAL TRANSPLANT: ICD-10-CM

## 2025-06-13 DIAGNOSIS — H52.212 IRREGULAR ASTIGMATISM OF LEFT EYE: Primary | ICD-10-CM

## 2025-06-13 DIAGNOSIS — H17.11 CENTRAL CORNEAL OPACITY, RIGHT: ICD-10-CM

## 2025-06-13 PROCEDURE — 99999 PR PBB SHADOW E&M-EST. PATIENT-LVL III: CPT | Mod: PBBFAC,,, | Performed by: OPTOMETRIST

## 2025-06-13 NOTE — PROGRESS NOTES
HPI     Follow-up            Comments: Pt reports for corneal recheck.  Pt states her VA with the CTL tends fluctates through out the day and   sometimes she will experience double vision with her CTL in. Pt states she   has been experiencing some itching, irritation, and discharge from her   right eye.          Comments    Daughter of Rosalind Brady (Former patient)     Perpendicular IOP's    1.PCIOL OU ([facility])   2.YAG OU ([facility])   3.8 cut RK Sx w/ multiple AK Sutures OU ([facility])   Patient saw Dr. Belle not a candidate for corneal transplant  4.Irregular Astigmatism   5. Blepharoplasty 9/15   6. COAG suspect +FHx (mother + son)      Systane PRN OU             Last edited by Sheridan Schmidt on 6/13/2025 10:14 AM.            Assessment /Plan     For exam results, see Encounter Report.    1. Irregular astigmatism of left eye  Continue current scleral lens wear.     2. History of corneal transplant  S/p PKP OD with Dr Belle. Continue per Dr Belle.     3. Central corneal opacity, right  Observe    4. Corneal neovascularization of both eyes  Limit CTL wear time to waking hours. Pannus OS stable at this time. [place] XO2 fitted for better O2 permeablity.     5. Open angle with borderline findings and low glaucoma risk in both eyes  Previously seen Dr CPG, lost to f/u. Recommend DFE with gOCT x 6 months.      RTC with Dr Belle as scheduled  F/U  in 6 months for gOCT, DFE, IOP

## 2025-07-23 DIAGNOSIS — H52.212 IRREGULAR ASTIGMATISM OF LEFT EYE: ICD-10-CM

## 2025-07-23 RX ORDER — MOXIFLOXACIN 5 MG/ML
SOLUTION/ DROPS OPHTHALMIC
Qty: 3 ML | Refills: 2 | Status: SHIPPED | OUTPATIENT
Start: 2025-07-23